# Patient Record
Sex: FEMALE | Race: BLACK OR AFRICAN AMERICAN | NOT HISPANIC OR LATINO | Employment: FULL TIME | URBAN - METROPOLITAN AREA
[De-identification: names, ages, dates, MRNs, and addresses within clinical notes are randomized per-mention and may not be internally consistent; named-entity substitution may affect disease eponyms.]

---
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
## 2019-01-25 ENCOUNTER — OFFICE VISIT (OUTPATIENT)
Dept: MEDICAL GROUP | Facility: MEDICAL CENTER | Age: 19
End: 2019-01-25
Attending: FAMILY MEDICINE
Payer: MEDICAID

## 2019-01-25 ENCOUNTER — HOSPITAL ENCOUNTER (OUTPATIENT)
Dept: LAB | Facility: MEDICAL CENTER | Age: 19
End: 2019-01-25
Attending: FAMILY MEDICINE
Payer: MEDICAID

## 2019-01-25 VITALS
HEART RATE: 88 BPM | TEMPERATURE: 98.1 F | DIASTOLIC BLOOD PRESSURE: 66 MMHG | HEIGHT: 62 IN | OXYGEN SATURATION: 99 % | SYSTOLIC BLOOD PRESSURE: 110 MMHG | RESPIRATION RATE: 18 BRPM | WEIGHT: 125 LBS | BODY MASS INDEX: 23 KG/M2

## 2019-01-25 DIAGNOSIS — M79.674 PAIN OF TOE OF RIGHT FOOT: ICD-10-CM

## 2019-01-25 DIAGNOSIS — Z3A.18 18 WEEKS GESTATION OF PREGNANCY: ICD-10-CM

## 2019-01-25 LAB
ERYTHROCYTE [SEDIMENTATION RATE] IN BLOOD BY WESTERGREN METHOD: 38 MM/HOUR (ref 0–20)
URATE SERPL-MCNC: 2.6 MG/DL (ref 1.9–8.2)

## 2019-01-25 PROCEDURE — 85652 RBC SED RATE AUTOMATED: CPT

## 2019-01-25 PROCEDURE — 99213 OFFICE O/P EST LOW 20 MIN: CPT | Performed by: FAMILY MEDICINE

## 2019-01-25 PROCEDURE — 36415 COLL VENOUS BLD VENIPUNCTURE: CPT

## 2019-01-25 PROCEDURE — 84550 ASSAY OF BLOOD/URIC ACID: CPT

## 2019-01-25 ASSESSMENT — PATIENT HEALTH QUESTIONNAIRE - PHQ9: CLINICAL INTERPRETATION OF PHQ2 SCORE: 0

## 2019-01-25 NOTE — PROGRESS NOTES
Chief Complaint:   Chief Complaint   Patient presents with   • Annual Exam       HPI: Established patient  Joann Medley is a 18 y.o. female who presents for      Pain of toe of right foot  Patient reports that around 2 month ago she started to have pain and swelling and enlargement of the second toe and she went to emergency room where she was treated as possible paronychia and incision and drainage and antibiotics was provided to her.  She said antibiotics did not help, she continued to have swelling of the toe without pain or tenderness, today she would like me to have a look at her toe and see if there is still infection.  Reviewed x-ray that was done at emergency room no evidence of acute deformity or fracture.       18 weeks gestation of pregnancy  This is an incidental pregnancy patient was on on her oral contraceptive pills and she missed the.  And she found out that she is pregnant did not establish care with pregnancy center yet, feels the baby movement, continues to take  vitamins.          Past medical history, family history, social history and medications reviewed and updated in the record. today  Current medications, problem list and allergies reviewed in [facility] today  Health maintenance topics are reviewed and updated.    Patient Active Problem List    Diagnosis Date Noted   • Anemia 2017   • Vitamin D deficiency disease 2017   • Tremor 10/27/2016   • Back pain 10/27/2016     Family History   Problem Relation Age of Onset   • Alcohol/Drug Father    • Psychiatry Father    • Cancer Paternal Grandmother         ?     Social History     Social History   • Marital status: Single     Spouse name: N/A   • Number of children: N/A   • Years of education: N/A     Occupational History   • Not on file.     Social History Main Topics   • Smoking status: Never Smoker   • Smokeless tobacco: Never Used   • Alcohol use No   • Drug use: No   • Sexual activity: Yes     Partners: Male      " Comment: pregnant      Other Topics Concern   • Behavioral Problems No   • Interpersonal Relationships No   • Sad Or Not Enjoying Activities No   • Suicidal Thoughts No   • Poor School Performance No   • Reading Difficulties No   • Speech Difficulties No   • Writing Difficulties No   • Inadequate Sleep No   • Excessive Tv Viewing No   • Excessive Video Game Use No   • Inadequate Exercise No   • Sports Related No   • Poor Diet Yes   • Family Concerns For Drug/Alcohol Abuse No   • Poor Oral Hygiene No   • Bike Safety Yes   • Family Concerns Vehicle Safety No     Social History Narrative   • No narrative on file     Current Outpatient Prescriptions   Medication Sig Dispense Refill   • Norgestim-Eth Estrad Triphasic 0.18/0.215/0.25 MG-25 MCG Tab Take 1 Tab by mouth every day. 28 Tab 11     No current facility-administered medications for this visit.            Review Of Systems  As documented in HPI above  PHYSICAL EXAMINATION:    /66 (BP Location: Left arm, Patient Position: Sitting, BP Cuff Size: Adult)   Pulse 88   Temp 36.7 °C (98.1 °F) (Temporal)   Resp 18   Ht 1.575 m (5' 2\")   Wt 56.7 kg (125 lb)   LMP 09/30/2018 (Within Days)   SpO2 99%   Breastfeeding? No   BMI 22.86 kg/m²   Gen.: Well-developed, well-nourished, no apparent distress, pleasant and cooperative with the examination  HEENT: Normocephalic/atraumatic,     Neck: No JVD or bruits, no adenopathy  Cor: Regular rate and rhythm without murmur gallop or rub  Lungs: Clear to auscultation with equal breath sounds bilaterally. No wheezes, rhonchi.  Abdomen: Soft abdomen, uterus at the level of 20    .    Extremities: No cyanosis, clubbing or edema, right foot exam second toe swollen compared to the other toes on the toe on the left side, there is no evidence of tenderness or erythema no pain no deformity nail bed without tenderness or drainage.        ASSESSMENT/Plan:  1. Pain of toe of right foot   etiology unclear, no evidence of infection or " inflammation active at this time advised to check uric acid level      URIC ACID    JUAQUIN SED RATE   2. 18 weeks gestation of pregnancy   encouraged to establish with pregnancy center for further pregnancy follow-up continue prenatal vitamins no red flags at this time do an ultrasound to assess fetal age and pregnancy assessment.      US-OB 2ND 3RD TRI COMPLETE       Please note that this dictation was created using voice recognition software. I have made every reasonable attempt to correct obvious errors but there may be errors of grammar and content that I may have overlooked prior to finalization of this note.

## 2019-02-07 ENCOUNTER — HOSPITAL ENCOUNTER (OUTPATIENT)
Dept: RADIOLOGY | Facility: MEDICAL CENTER | Age: 19
End: 2019-02-07
Attending: FAMILY MEDICINE
Payer: MEDICAID

## 2019-02-07 DIAGNOSIS — Z3A.18 18 WEEKS GESTATION OF PREGNANCY: ICD-10-CM

## 2019-02-07 PROCEDURE — 76805 OB US >/= 14 WKS SNGL FETUS: CPT

## 2019-04-18 ENCOUNTER — HOSPITAL ENCOUNTER (OUTPATIENT)
Facility: MEDICAL CENTER | Age: 19
End: 2019-04-18
Attending: NURSE PRACTITIONER
Payer: MEDICAID

## 2019-04-18 ENCOUNTER — APPOINTMENT (OUTPATIENT)
Dept: LAB | Facility: MEDICAL CENTER | Age: 19
End: 2019-04-18
Payer: MEDICAID

## 2019-04-18 ENCOUNTER — INITIAL PRENATAL (OUTPATIENT)
Dept: OBGYN | Facility: CLINIC | Age: 19
End: 2019-04-18
Payer: MEDICAID

## 2019-04-18 VITALS — BODY MASS INDEX: 25.06 KG/M2 | WEIGHT: 137 LBS | DIASTOLIC BLOOD PRESSURE: 68 MMHG | SYSTOLIC BLOOD PRESSURE: 112 MMHG

## 2019-04-18 DIAGNOSIS — Z34.00 PREGNANCY, SUPERVISION OF FIRST, UNSPECIFIED TRIMESTER: ICD-10-CM

## 2019-04-18 DIAGNOSIS — Z34.00 ENCOUNTER FOR SUPERVISION PREGNANCY IN PRIMIGRAVIDA, ANTEPARTUM: ICD-10-CM

## 2019-04-18 PROBLEM — O09.30 LATE PRENATAL CARE AFFECTING PREGNANCY: Status: ACTIVE | Noted: 2019-04-18

## 2019-04-18 LAB
APPEARANCE UR: NORMAL
BILIRUB UR STRIP-MCNC: NORMAL MG/DL
COLOR UR AUTO: NORMAL
GLUCOSE UR STRIP.AUTO-MCNC: NORMAL MG/DL
KETONES UR STRIP.AUTO-MCNC: NORMAL MG/DL
LEUKOCYTE ESTERASE UR QL STRIP.AUTO: NORMAL
NITRITE UR QL STRIP.AUTO: NORMAL
PH UR STRIP.AUTO: 6.5 [PH] (ref 5–8)
PROT UR QL STRIP: NORMAL MG/DL
RBC UR QL AUTO: NORMAL
SP GR UR STRIP.AUTO: 1.02
UROBILINOGEN UR STRIP-MCNC: NORMAL MG/DL

## 2019-04-18 PROCEDURE — 0500F INITIAL PRENATAL CARE VISIT: CPT | Performed by: NURSE PRACTITIONER

## 2019-04-18 PROCEDURE — 87591 N.GONORRHOEAE DNA AMP PROB: CPT

## 2019-04-18 PROCEDURE — 87491 CHLMYD TRACH DNA AMP PROBE: CPT

## 2019-04-18 PROCEDURE — 81002 URINALYSIS NONAUTO W/O SCOPE: CPT | Performed by: NURSE PRACTITIONER

## 2019-04-18 PROCEDURE — 90471 IMMUNIZATION ADMIN: CPT | Performed by: NURSE PRACTITIONER

## 2019-04-18 PROCEDURE — 90715 TDAP VACCINE 7 YRS/> IM: CPT | Performed by: NURSE PRACTITIONER

## 2019-04-18 NOTE — PROGRESS NOTES
Subjective:   Joann Medley is a 18 y.o.  who presents for her new OB exam.  She is 30w4d with an ALISON of Estimated Date of Delivery: 19 by LMP she is sure of that comes each month. She is feeling well and has no concerns at this time. Denies VB, LOF, contractions or pain. No ER visits or previous care in this pregnancy. Denies dysuria, vaginal DC, fever. Reports fetal movement. Too late AFP.  Declines CF.       History reviewed. No pertinent past medical history.    Psych Hx: Patient denies any history of depression, anxiety, PTSD, bipolar or any other psychological issues.     History reviewed. No pertinent surgical history.     OB History    Para Term  AB Living   1             SAB TAB Ectopic Molar Multiple Live Births                    # Outcome Date GA Lbr Kyle/2nd Weight Sex Delivery Anes PTL Lv   1 Current                    Gynecological Hx: Denies any hx of STIs, including HSV. Denies any vulvovaginal disorders and no hx of abnormal cervical cytology. Last pap not done.     Sexual Hx: Zero sexual partners currently.     Contraceptive Hx: Has used pills in the past and has since discontinued use. She got pregnant on pills because she was taking antibiotics and was not told that they interacted     Family History   Problem Relation Age of Onset   • Cancer Paternal Grandmother         ?     Denies any genetic disorders in family history.     Social History     Social History   • Marital status: Single     Spouse name: N/A   • Number of children: N/A   • Years of education: N/A     Occupational History   • Not on file.     Social History Main Topics   • Smoking status: Never Smoker   • Smokeless tobacco: Never Used   • Alcohol use No   • Drug use: No   • Sexual activity: Yes     Partners: Male     Birth control/ protection: Pill      Comment: Unplanned pregnanacy. Pt states was pregnant while on the pill     Other Topics Concern   • Behavioral Problems No   • Interpersonal  Relationships No   • Sad Or Not Enjoying Activities No   • Suicidal Thoughts No   • Poor School Performance No   • Reading Difficulties No   • Speech Difficulties No   • Writing Difficulties No   • Inadequate Sleep No   • Excessive Tv Viewing No   • Excessive Video Game Use No   • Inadequate Exercise No   • Sports Related No   • Poor Diet Yes   • Family Concerns For Drug/Alcohol Abuse No   • Poor Oral Hygiene No   • Bike Safety Yes   • Family Concerns Vehicle Safety No     Social History Narrative   • No narrative on file       NAI is semi-involved and does not live with Joann Medley. She lives with her aunt, grandma and mom. Pregnancy is unplanned but desired.    She is currently working at [employer], denies any heavy lifting or exposure to potential teratogens like environmental or occupational toxins.   Denies alcohol use, drug use, or tobacco use in pregnancy.   Denies any current or hx of sexual, emotional or physical abuse or trauma.     Current Medications: PNV   Allergies: Denies allergies to medications, food, or environmental allergies.     Objective:      Vitals:    04/18/19 0752   BP: 112/68   Weight: 62.1 kg (137 lb)        See Prenatal Physical and Prenatal Vitals  UA WNL today      Assessment:      1.  IUP @ 30w4d per LMP      2.  S=D      3.  See problem list as follows       Patient Active Problem List    Diagnosis Date Noted   • Supervision of first pregnancy  04/18/2019   • Late prenatal care affecting pregnancy 04/18/2019         Plan:   - GC/CT; no pap due to age  - 1 hr ordered  - s/p tdap today   - Prenatal labs ordered - lab slip given  - Discussed PNV, nutrition, adequate water intake, and exercise/weight gain in pregnancy  - NOB informational packet with anticipatory guidance given  - Information on Centering Pregnancy given, pt too late  - S/sx of pregnancy warning signs and PTL precautions given  - Complete OB US already done in February   - Return to [facility] in 2  wks

## 2019-04-18 NOTE — LETTER
Cystic Fibrosis Carrier Testing  Joann Medley    The following information is about a blood test that can be done to determine if you and/or your partner carry the gene for cystic fibrosis.    WHAT IS CYSTIC FIBROSIS?  · Cystic fibrosis (CF) is an inherited disease that affects more than 25,000 American children and young adults.  · Symptoms of CF vary but include lung congestion, pneumonia, diarrhea and poor growth.  Most people with CF have severe medical problems and some die at a young age.  Others have so few symptoms they are unaware they have CF.  · CF does not affect intelligence.  · Although there is no cure for CF at this time, scientists are making progress in improving treatment and in searching for a cure.  In the past many people with CF  at a very young age.  Today, many are living into their 20’s and 30’s.    IS THERE A CHANCE MY BABY COULD HAVE CYSTIC FIBROSIS?  · You can have a child with CF even if there is no history in your family (see chart below).  · CF testing can help determine if you are a carrier and at risk to have a child with CF.  Note: if both parents are carriers, there is a 1 in 4 (25%) chance with each pregnancy that they will have a child with CF.  · Carriers have one normal CF gene and one altered CF gene.  · People with CF have two altered CF genes.  · Most people have two normal copies of the CF gene.    Approximate risk that a couple with no family history of cystic fibrosis will have a child with cystic fibrosis:    Ethnic background / Risk     couple:  1 in 2,500   couple:  1 in 15,000            couple:  1 in 8,000     American couple:  1 in 32,000     WHAT TESTING IS AVAILABLE?  · There is a blood test that can be done to find out if you or your partner is a carrier.  · It is important to understand that CF carrier testing does not detect all CF carriers.  · If the test shows that you are both CF carriers, you unborn  baby can be tested to find out if the baby has CF.    HOW MUCH DOES IT COST TO HAVE CYSTIC FIBROSIS CARRIER TESTING?  · Cost and insurance coverage for CF carrier testing vary depending upon the laboratory used and your insurance policy.  · The average cost for CF carrier testing is $300 per person.  · Your genetic counselor can provide you with more information about cystic fibrosis carrier testing.    _____  Yes, I am interested in discussing carrier testing with a genetic counselor.    _____  No, I am not interested in CF carrier testing or in receiving more information about CF carrier testing.      Client signature: ________________________________________  4/18/2019

## 2019-04-18 NOTE — LETTER
"Count Your Baby's Movements  Another step to a healthy delivery    Ingrid Medley             Dept: [phone]    How Many Weeks Pregnant? 30W4D    Date to Begin Countin2019              How to use this chart    One way for your physician to keep track of your baby's health is by knowing how often the baby moves (or \"kicks\") in your womb.  You can help your physician to do this by using this chart every day.    Every day, you should see how many hours it takes for your baby to move 10 times.  Start in the morning, as soon as you get up.    · First, write down the time your baby moves until you get to 10.  · Check off one box every time your baby moves until you get to 10.  · Write down the time you finished counting in the last column.  · Total how long it took to count up all 10 movements.  · Finally, fill in the box that shows how long this took.  After counting 10 movements, you no longer have to count any more that day.  The next morning, just start counting again as soon as you get up.    What should you call a \"movement\"?  It is hard to say, because it will feel different from one mother to another and from one pregnancy to the next.  The important thing is that you count the movements the same way throughout your pregnancy.  If you have more questions, you should ask your physician.    Count carefully every day!  SAMPLE:  Week 28    How many hours did it take to feel 10 movements?       Start  Time     1     2     3     4     5     6     7     8     9     10   Finish Time   Mon 8:20 ·  ·  ·  ·  ·  ·  ·  ·  ·  ·  11:40                  Sat               Sun                 IMPORTANT: You should contact your physician if it takes more than two hours for you to feel 10 movements.  Each morning, write down the time and start to count the movements of your baby.  Keep track by checking off one box every time you feel one movement.  When you " "have felt 10 \"kicks\", write down the time you finished counting in the last column.  Then fill in the   box (over the check charlene) for the number of hours it took.  Be sure to read the complete instructions on the previous page.            "

## 2019-04-18 NOTE — PROGRESS NOTES
Pt here today for NOB visit  LMP: 09/16/2018  Pt had US at [facility] on 02/07/2019 with ALISON of 06/19/2019  WT: 137 lb  BP: 112/68  Pt states no complaints or concerns today  SIDNEY sheet given and explained today   Desires Tdap vaccine  Good # [phone]

## 2019-04-18 NOTE — LETTER
"Count Your Baby's Movements  Another step to a healthy delivery    [facility]             Dept: [phone]    How Many Weeks Pregnant? 30 weeks 4 days     Date to Begin Counting: today               How to use this chart    One way for your physician to keep track of your baby's health is by knowing how often the baby moves (or \"kicks\") in your womb.  You can help your physician to do this by using this chart every day.    Every day, you should see how many hours it takes for your baby to move 10 times.  Start in the morning, as soon as you get up.    · First, write down the time your baby moves until you get to 10.  · Check off one box every time your baby moves until you get to 10.  · Write down the time you finished counting in the last column.  · Total how long it took to count up all 10 movements.  · Finally, fill in the box that shows how long this took.  After counting 10 movements, you no longer have to count any more that day.  The next morning, just start counting again as soon as you get up.    What should you call a \"movement\"?  It is hard to say, because it will feel different from one mother to another and from one pregnancy to the next.  The important thing is that you count the movements the same way throughout your pregnancy.  If you have more questions, you should ask your physician.    Count carefully every day!  SAMPLE:  Week 28    How many hours did it take to feel 10 movements?       Start  Time     1     2     3     4     5     6     7     8     9     10   Finish Time   Mon 8:20 ·  ·  ·  ·  ·  ·  ·  ·  ·  ·  11:40   Tue Wed Thu Fri               Sat               Sun                 IMPORTANT: You should contact your physician if it takes more than two hours for you to feel 10 movements.  Each morning, write down the time and start to count the movements of your baby.  Keep track by checking off one box every time you feel one movement.  " "When you have felt 10 \"kicks\", write down the time you finished counting in the last column.  Then fill in the   box (over the check charlene) for the number of hours it took.  Be sure to read the complete instructions on the previous page.            "

## 2019-04-19 ENCOUNTER — HOSPITAL ENCOUNTER (OUTPATIENT)
Dept: LAB | Facility: MEDICAL CENTER | Age: 19
End: 2019-04-19
Attending: NURSE PRACTITIONER
Payer: MEDICAID

## 2019-04-19 DIAGNOSIS — Z34.00 PREGNANCY, SUPERVISION OF FIRST, UNSPECIFIED TRIMESTER: ICD-10-CM

## 2019-04-19 LAB
ABO GROUP BLD: NORMAL
APPEARANCE UR: CLEAR
BACTERIA #/AREA URNS HPF: ABNORMAL /HPF
BASOPHILS # BLD AUTO: 0.7 % (ref 0–1.8)
BASOPHILS # BLD: 0.03 K/UL (ref 0–0.12)
BILIRUB UR QL STRIP.AUTO: NEGATIVE
BLD GP AB SCN SERPL QL: NORMAL
C TRACH DNA SPEC QL NAA+PROBE: NEGATIVE
COLOR UR: YELLOW
EOSINOPHIL # BLD AUTO: 0.03 K/UL (ref 0–0.51)
EOSINOPHIL NFR BLD: 0.7 % (ref 0–6.9)
EPI CELLS #/AREA URNS HPF: ABNORMAL /HPF
ERYTHROCYTE [DISTWIDTH] IN BLOOD BY AUTOMATED COUNT: 39.4 FL (ref 35.9–50)
GLUCOSE 1H P 50 G GLC PO SERPL-MCNC: 75 MG/DL (ref 70–139)
GLUCOSE UR STRIP.AUTO-MCNC: NEGATIVE MG/DL
HBV SURFACE AG SER QL: NEGATIVE
HCT VFR BLD AUTO: 30.9 % (ref 37–47)
HGB BLD-MCNC: 9.6 G/DL (ref 12–16)
HIV 1+2 AB+HIV1 P24 AG SERPL QL IA: NON REACTIVE
IMM GRANULOCYTES # BLD AUTO: 0.02 K/UL (ref 0–0.11)
IMM GRANULOCYTES NFR BLD AUTO: 0.5 % (ref 0–0.9)
KETONES UR STRIP.AUTO-MCNC: NEGATIVE MG/DL
LEUKOCYTE ESTERASE UR QL STRIP.AUTO: ABNORMAL
LYMPHOCYTES # BLD AUTO: 0.84 K/UL (ref 1–4.8)
LYMPHOCYTES NFR BLD: 20.3 % (ref 22–41)
MCH RBC QN AUTO: 27.7 PG (ref 27–33)
MCHC RBC AUTO-ENTMCNC: 31.1 G/DL (ref 33.6–35)
MCV RBC AUTO: 89.3 FL (ref 81.4–97.8)
MICRO URNS: ABNORMAL
MONOCYTES # BLD AUTO: 0.33 K/UL (ref 0–0.85)
MONOCYTES NFR BLD AUTO: 8 % (ref 0–13.4)
MUCOUS THREADS #/AREA URNS HPF: ABNORMAL /HPF
N GONORRHOEA DNA SPEC QL NAA+PROBE: NEGATIVE
NEUTROPHILS # BLD AUTO: 2.89 K/UL (ref 2–7.15)
NEUTROPHILS NFR BLD: 69.8 % (ref 44–72)
NITRITE UR QL STRIP.AUTO: NEGATIVE
NRBC # BLD AUTO: 0 K/UL
NRBC BLD-RTO: 0 /100 WBC
PH UR STRIP.AUTO: 6.5 [PH]
PLATELET # BLD AUTO: 145 K/UL (ref 164–446)
PMV BLD AUTO: 11.4 FL (ref 9–12.9)
PROT UR QL STRIP: NEGATIVE MG/DL
RBC # BLD AUTO: 3.46 M/UL (ref 4.2–5.4)
RBC # URNS HPF: ABNORMAL /HPF
RBC UR QL AUTO: NEGATIVE
RH BLD: NORMAL
RUBV AB SER QL: 122 IU/ML
SP GR UR STRIP.AUTO: 1.02
SPECIMEN SOURCE: NORMAL
TREPONEMA PALLIDUM IGG+IGM AB [PRESENCE] IN SERUM OR PLASMA BY IMMUNOASSAY: NON REACTIVE
UROBILINOGEN UR STRIP.AUTO-MCNC: 0.2 MG/DL
WBC # BLD AUTO: 4.1 K/UL (ref 4.8–10.8)
WBC #/AREA URNS HPF: ABNORMAL /HPF

## 2019-04-19 PROCEDURE — 36415 COLL VENOUS BLD VENIPUNCTURE: CPT

## 2019-04-19 PROCEDURE — 86762 RUBELLA ANTIBODY: CPT

## 2019-04-19 PROCEDURE — 87340 HEPATITIS B SURFACE AG IA: CPT

## 2019-04-19 PROCEDURE — 87389 HIV-1 AG W/HIV-1&-2 AB AG IA: CPT

## 2019-04-19 PROCEDURE — 85025 COMPLETE CBC W/AUTO DIFF WBC: CPT

## 2019-04-19 PROCEDURE — 82950 GLUCOSE TEST: CPT

## 2019-04-19 PROCEDURE — 86780 TREPONEMA PALLIDUM: CPT

## 2019-04-19 PROCEDURE — 81001 URINALYSIS AUTO W/SCOPE: CPT

## 2019-04-19 PROCEDURE — 86901 BLOOD TYPING SEROLOGIC RH(D): CPT

## 2019-04-19 PROCEDURE — 86900 BLOOD TYPING SEROLOGIC ABO: CPT

## 2019-04-19 PROCEDURE — 86850 RBC ANTIBODY SCREEN: CPT

## 2019-04-20 PROBLEM — O99.019 ANEMIA IN PREGNANCY: Status: ACTIVE | Noted: 2019-04-20

## 2019-04-22 ENCOUNTER — TELEPHONE (OUTPATIENT)
Dept: OBGYN | Facility: CLINIC | Age: 19
End: 2019-04-22

## 2019-04-22 NOTE — TELEPHONE ENCOUNTER
----- Message from LIBERTY Jane sent at 4/20/2019  8:11 PM PDT -----  Pt needs iron 3 times a day.    4/22/19 1040 Called pt, [facility] reach [facility]. [facility]

## 2019-04-22 NOTE — TELEPHONE ENCOUNTER
Pt called returned Jacqueline's call.  Pt notified of need to start on Iron supplementation to take 325 mg TID. Recommended to take it with orange juice, to avoid milk products 1hr before and 2hr after. Not to take with PNV. To increase water intake to decrease side effects of constipation. Pt verbalized understanding.

## 2019-04-24 ENCOUNTER — HOSPITAL ENCOUNTER (EMERGENCY)
Facility: MEDICAL CENTER | Age: 19
End: 2019-04-24
Attending: EMERGENCY MEDICINE
Payer: MEDICAID

## 2019-04-24 VITALS
RESPIRATION RATE: 17 BRPM | HEART RATE: 90 BPM | SYSTOLIC BLOOD PRESSURE: 105 MMHG | TEMPERATURE: 98.1 F | OXYGEN SATURATION: 99 % | DIASTOLIC BLOOD PRESSURE: 63 MMHG

## 2019-04-24 DIAGNOSIS — E86.0 DEHYDRATION: ICD-10-CM

## 2019-04-24 DIAGNOSIS — R55 SYNCOPE, UNSPECIFIED SYNCOPE TYPE: ICD-10-CM

## 2019-04-24 DIAGNOSIS — E87.6 HYPOKALEMIA: ICD-10-CM

## 2019-04-24 LAB
ALBUMIN SERPL BCP-MCNC: 2.2 G/DL (ref 3.2–4.9)
ALBUMIN/GLOB SERPL: 0.7 G/DL
ALP SERPL-CCNC: 55 U/L (ref 30–99)
ALT SERPL-CCNC: 9 U/L (ref 2–50)
ANION GAP SERPL CALC-SCNC: 7 MMOL/L (ref 0–11.9)
AST SERPL-CCNC: 20 U/L (ref 12–45)
BASOPHILS # BLD AUTO: 0.5 % (ref 0–1.8)
BASOPHILS # BLD: 0.02 K/UL (ref 0–0.12)
BILIRUB SERPL-MCNC: 0.3 MG/DL (ref 0.1–1.5)
BUN SERPL-MCNC: 7 MG/DL (ref 8–22)
CALCIUM SERPL-MCNC: 7.7 MG/DL (ref 8.4–10.2)
CHLORIDE SERPL-SCNC: 106 MMOL/L (ref 96–112)
CO2 SERPL-SCNC: 20 MMOL/L (ref 20–33)
CREAT SERPL-MCNC: 0.79 MG/DL (ref 0.5–1.4)
EKG IMPRESSION: NORMAL
EOSINOPHIL # BLD AUTO: 0.04 K/UL (ref 0–0.51)
EOSINOPHIL NFR BLD: 1 % (ref 0–6.9)
ERYTHROCYTE [DISTWIDTH] IN BLOOD BY AUTOMATED COUNT: 40 FL (ref 35.9–50)
GLOBULIN SER CALC-MCNC: 3.2 G/DL (ref 1.9–3.5)
GLUCOSE SERPL-MCNC: 100 MG/DL (ref 65–99)
HCT VFR BLD AUTO: 27.6 % (ref 37–47)
HGB BLD-MCNC: 8.7 G/DL (ref 12–16)
IMM GRANULOCYTES # BLD AUTO: 0.01 K/UL (ref 0–0.11)
IMM GRANULOCYTES NFR BLD AUTO: 0.2 % (ref 0–0.9)
LYMPHOCYTES # BLD AUTO: 1.11 K/UL (ref 1–4.8)
LYMPHOCYTES NFR BLD: 27.7 % (ref 22–41)
MCH RBC QN AUTO: 27.5 PG (ref 27–33)
MCHC RBC AUTO-ENTMCNC: 31.5 G/DL (ref 33.6–35)
MCV RBC AUTO: 87.3 FL (ref 81.4–97.8)
MONOCYTES # BLD AUTO: 0.4 K/UL (ref 0–0.85)
MONOCYTES NFR BLD AUTO: 10 % (ref 0–13.4)
NEUTROPHILS # BLD AUTO: 2.43 K/UL (ref 2–7.15)
NEUTROPHILS NFR BLD: 60.6 % (ref 44–72)
NRBC # BLD AUTO: 0 K/UL
NRBC BLD-RTO: 0 /100 WBC
PLATELET # BLD AUTO: 149 K/UL (ref 164–446)
PMV BLD AUTO: 11.4 FL (ref 9–12.9)
POTASSIUM SERPL-SCNC: 3 MMOL/L (ref 3.6–5.5)
PROT SERPL-MCNC: 5.4 G/DL (ref 6–8.2)
RBC # BLD AUTO: 3.16 M/UL (ref 4.2–5.4)
SODIUM SERPL-SCNC: 133 MMOL/L (ref 135–145)
WBC # BLD AUTO: 4 K/UL (ref 4.8–10.8)

## 2019-04-24 PROCEDURE — 94760 N-INVAS EAR/PLS OXIMETRY 1: CPT

## 2019-04-24 PROCEDURE — 80053 COMPREHEN METABOLIC PANEL: CPT

## 2019-04-24 PROCEDURE — 93005 ELECTROCARDIOGRAM TRACING: CPT

## 2019-04-24 PROCEDURE — 700102 HCHG RX REV CODE 250 W/ 637 OVERRIDE(OP): Performed by: EMERGENCY MEDICINE

## 2019-04-24 PROCEDURE — A9270 NON-COVERED ITEM OR SERVICE: HCPCS | Performed by: EMERGENCY MEDICINE

## 2019-04-24 PROCEDURE — 99285 EMERGENCY DEPT VISIT HI MDM: CPT

## 2019-04-24 PROCEDURE — 85025 COMPLETE CBC W/AUTO DIFF WBC: CPT

## 2019-04-24 PROCEDURE — 700105 HCHG RX REV CODE 258: Performed by: EMERGENCY MEDICINE

## 2019-04-24 RX ORDER — POTASSIUM CHLORIDE 20 MEQ/1
20 TABLET, EXTENDED RELEASE ORAL ONCE
Status: COMPLETED | OUTPATIENT
Start: 2019-04-24 | End: 2019-04-24

## 2019-04-24 RX ORDER — SODIUM CHLORIDE 9 MG/ML
1000 INJECTION, SOLUTION INTRAVENOUS ONCE
Status: COMPLETED | OUTPATIENT
Start: 2019-04-24 | End: 2019-04-24

## 2019-04-24 RX ADMIN — SODIUM CHLORIDE 1000 ML: 9 INJECTION, SOLUTION INTRAVENOUS at 18:12

## 2019-04-24 RX ADMIN — POTASSIUM CHLORIDE 20 MEQ: 1500 TABLET, EXTENDED RELEASE ORAL at 18:49

## 2019-04-25 NOTE — ED PROVIDER NOTES
ED Provider Note    ED Provider Note    Primary care provider: Ellie Muhammad M.D.    CHIEF COMPLAINT  Chief Complaint   Patient presents with   • Syncope     pt bib REMSA for sycopal episodes X2 today. Patient 31 weeks pregnant.        HPI  Joann Medley is a 18 y.o. female who presents with a complaint of passing out.  She was sitting in the passenger seat of a car when she was unresponsive.  Her family member states she was talking to her at the time.  She seemed fine when she got in the car.  She states that she looked over and she was unresponsive very pale.  She came to after about 10 seconds.  Paramedics were called.  In route she had another syncopal episode.  No cardiac arrhythmia in route.  She did have a low blood pressure.  She did recently have blood work completed which showed severe anemia.  They are not willing to have a blood transfusion so she was placed on iron and new prenatal vitamins.  She is 31 weeks pregnant.  She denies any bleeding such as black or bloody stools or nosebleeds.  She denies any other complaints.  She has had no complications with her pregnancy.  She has normal prenatal care has had a normal ultrasound.    REVIEW OF SYSTEMS  ROS  All other systems negative. See HPI for further details.       ALLERGIES  No Known Allergies    CURRENT MEDICATIONS  Home Medications    **Home medications have not yet been reviewed for this encounter**     Prenatal vitamins and iron    PAST MEDICAL HISTORY   has a past medical history of Anemia in pregnancy (4/20/2019).    SURGICAL HISTORY  patient denies any surgical history    SOCIAL HISTORY  Social History   Substance Use Topics   • Smoking status: Never Smoker   • Smokeless tobacco: Never Used   • Alcohol use No      History   Drug Use No       FAMILY HISTORY  Family History   Problem Relation Age of Onset   • Cancer Paternal Grandmother         ?         PHYSICAL EXAM  VITAL SIGNS: /63   Pulse 90   Temp 36.7 °C (98.1 °F)  (Temporal)   Resp 17   LMP 09/16/2018   SpO2 99%   Constitutional: Well-nourished, Well developed. In mild distress.   Head: Normocephalic, Atraumatic  Eyes: PERRL, sclera anicteric, EOMI, no lid swelling.  ENT: No nasal discharge or epistaxis. No facial deformity. Mucous membranes are moist.  Pale gingiva.  Cardiovascular: Regular rate and rhythm without S3,S4, or murmur.   Lungs: No respiratory distress. No cough. Lungs are clear bilaterally. No rales, rhonchi, or wheezing.   Abdomen: Soft, non-tender, gravid uterus. Without organomegaly. No rebound, rigidity, or guarding. No masses or abnormal pulsations.   Skin: Without significant rash or lesions.  Skin pallor is noted.  Extremities: No deformity. Non-tender. No swelling.  Neurologic: Awake and alert. Normal speech. No sensory deficits. No motor deficits.       DIAGNOSTIC STUDIES / PROCEDURES    Results for orders placed or performed during the hospital encounter of 04/24/19   CBC WITH DIFFERENTIAL   Result Value Ref Range    WBC 4.0 (L) 4.8 - 10.8 K/uL    RBC 3.16 (L) 4.20 - 5.40 M/uL    Hemoglobin 8.7 (L) 12.0 - 16.0 g/dL    Hematocrit 27.6 (L) 37.0 - 47.0 %    MCV 87.3 81.4 - 97.8 fL    MCH 27.5 27.0 - 33.0 pg    MCHC 31.5 (L) 33.6 - 35.0 g/dL    RDW 40.0 35.9 - 50.0 fL    Platelet Count 149 (L) 164 - 446 K/uL    MPV 11.4 9.0 - 12.9 fL    Neutrophils-Polys 60.60 44.00 - 72.00 %    Lymphocytes 27.70 22.00 - 41.00 %    Monocytes 10.00 0.00 - 13.40 %    Eosinophils 1.00 0.00 - 6.90 %    Basophils 0.50 0.00 - 1.80 %    Immature Granulocytes 0.20 0.00 - 0.90 %    Nucleated RBC 0.00 /100 WBC    Neutrophils (Absolute) 2.43 2.00 - 7.15 K/uL    Lymphs (Absolute) 1.11 1.00 - 4.80 K/uL    Monos (Absolute) 0.40 0.00 - 0.85 K/uL    Eos (Absolute) 0.04 0.00 - 0.51 K/uL    Baso (Absolute) 0.02 0.00 - 0.12 K/uL    Immature Granulocytes (abs) 0.01 0.00 - 0.11 K/uL    NRBC (Absolute) 0.00 K/uL   COMP METABOLIC PANEL   Result Value Ref Range    Sodium 133 (L) 135 - 145  mmol/L    Potassium 3.0 (L) 3.6 - 5.5 mmol/L    Chloride 106 96 - 112 mmol/L    Co2 20 20 - 33 mmol/L    Anion Gap 7.0 0.0 - 11.9    Glucose 100 (H) 65 - 99 mg/dL    Bun 7 (L) 8 - 22 mg/dL    Creatinine 0.79 0.50 - 1.40 mg/dL    Calcium 7.7 (L) 8.4 - 10.2 mg/dL    AST(SGOT) 20 12 - 45 U/L    ALT(SGPT) 9 2 - 50 U/L    Alkaline Phosphatase 55 30 - 99 U/L    Total Bilirubin 0.3 0.1 - 1.5 mg/dL    Albumin 2.2 (L) 3.2 - 4.9 g/dL    Total Protein 5.4 (L) 6.0 - 8.2 g/dL    Globulin 3.2 1.9 - 3.5 g/dL    A-G Ratio 0.7 g/dL   ESTIMATED GFR   Result Value Ref Range    GFR If African American >60 >60 mL/min/1.73 m 2    GFR If Non African American >60 >60 mL/min/1.73 m 2   EKG   Result Value Ref Range    Report       [facility] Emergency Dept.    Test Date:  2019  Pt Name:    DESTINY VELÁSQUEZ            Department: [facility]  MRN:        [id number]                      Room:       -ROOM 4  Gender:     Female                       Technician: MARIELOS  :        2000                   Requested By:ER TRIAGE PROTOCOL  Order #:    [id number]                    Reading MD:    Measurements  Intervals                                Axis  Rate:       87                           P:          30  MN:         114                          QRS:        35  QRSD:       80                           T:          29  QT:         361  QTc:        435    Interpretive Statements  Sinus rhythm  Borderline short MN interval  Probable left atrial enlargement  No previous ECG available for comparison     EKG interp by myself.  Agree with NSR, 87 bpm, no ectopy, normal axis, no ST or T wave changes.    RADIOLOGY  No orders to display     Films read by Radiologist, and independently reviewed by myself.    COURSE & MEDICAL DECISION MAKING:  Nursing notes, VS, PMSFHx reviewed in chart.     Patient was evaluated in the emergency department.  She had no arrhythmia during her ER visit.  She improved with IV fluids alone.  She is not  as anemic as I was anticipating however her hemoglobin has dropped about 1 g in 5 days.  I cannot contribute this to her syncope however she may be slightly dehydrated which has improved with IV fluids.  She has a follow-up appointment with her OB next week.  She does need repeat blood work to check her hemoglobin and her potassium.  She was treated for hypo-kalemia with 20 mEq of K-Dur.  She was encouraged to stay hydrated and can use electrolyte water.  She is to look for signs of bleeding including black or bloody stools.  She has normal fetal heart tones and there is no indication for fetal monitoring on labor and delivery at this time.  She does need to maintain bedrest and keep her legs elevated and she was given a work note for tonight.  If she does not feel better tomorrow she needs to follow-up with her OB doctor for further evaluation.  She should refrain from driving, swimming or being up on ladders until she is cleared by her doctor.  She did not have a seizure therefore the DMV form is not required. She understood and agreed to the above plan.      FINAL IMPRESSION:  1. Dehydration    2. Syncope, unspecified syncope type    3. Hypokalemia         DISPOSITION:  Home    Please note that this dictation was created using voice recognition software. I have worked with consultants from the vendor as well as technical experts from [facility] to optimize the interface. I have made every reasonable attempt to correct obvious errors, but I expect that there are errors of grammar and possibly content that I did not discover before finalizing the note.

## 2019-04-25 NOTE — ED TRIAGE NOTES
Chief Complaint   Patient presents with   • Syncope     pt bib REMSA for sycopal episodes X2 today. Patient 31 weeks pregnant.

## 2019-05-02 ENCOUNTER — ROUTINE PRENATAL (OUTPATIENT)
Dept: OBGYN | Facility: CLINIC | Age: 19
End: 2019-05-02
Payer: MEDICAID

## 2019-05-02 VITALS — BODY MASS INDEX: 25.97 KG/M2 | SYSTOLIC BLOOD PRESSURE: 100 MMHG | WEIGHT: 142 LBS | DIASTOLIC BLOOD PRESSURE: 64 MMHG

## 2019-05-02 DIAGNOSIS — Z34.03 ENCOUNTER FOR SUPERVISION OF NORMAL FIRST PREGNANCY IN THIRD TRIMESTER: ICD-10-CM

## 2019-05-02 PROCEDURE — 90040 PR PRENATAL FOLLOW UP: CPT | Performed by: NURSE PRACTITIONER

## 2019-05-02 RX ORDER — FERROUS SULFATE 325(65) MG
325 TABLET ORAL DAILY
Qty: 30 TAB | Refills: 6 | Status: SHIPPED | OUTPATIENT
Start: 2019-05-02 | End: 2019-11-14

## 2019-05-02 NOTE — PROGRESS NOTES
Pt here today for OB follow up  Pt states   Reports +FM  Good # [phone]  Pharmacy Confirmed.

## 2019-05-02 NOTE — PROGRESS NOTES
S:  Pt is  at 32w4d for routine OB follow up.  Was seen in ED on  following syncope x 2 episodes. She says this happened after she started taking the iron supplements she was told to take. She has not taken the iron supplements since then.  Reports good FM.  Denies VB, LOF, RUCs or vaginal DC.    O:  Please see above vitals.        FHTs: 142        Fundal ht: 32 cm.        S=D    A:  IUP at 32w4d  Patient Active Problem List    Diagnosis Date Noted   • Anemia in pregnancy 2019   • Supervision of first pregnancy  2019   • Late prenatal care affecting pregnancy 2019        P: Discussed with pt the probability of her fainting was brought on by low BP due to her severe anemia rather than by the iron supplements. Given her reluctance to taking iron 3 times a day, we discussed taking her pnv daily, an iron supplement daily and adding iron rich foods to her diet: dark green leafy vegetables, iron fortified cereals, etc. Pt receptive to plan.          1.  GBS @ 35 wks.          2.  Continue FKCs.          3.  Questions answered.          4.  Encouraged pt to tour L&D.          5.  Encourage adequate water intake.        6.  F/u 2 wks.        7.  Rx for iron supplement

## 2019-05-15 ENCOUNTER — ROUTINE PRENATAL (OUTPATIENT)
Dept: OBGYN | Facility: CLINIC | Age: 19
End: 2019-05-15
Payer: MEDICAID

## 2019-05-15 VITALS — BODY MASS INDEX: 26.89 KG/M2 | DIASTOLIC BLOOD PRESSURE: 62 MMHG | WEIGHT: 147 LBS | SYSTOLIC BLOOD PRESSURE: 110 MMHG

## 2019-05-15 DIAGNOSIS — Z34.03 ENCOUNTER FOR PRENATAL CARE IN THIRD TRIMESTER OF FIRST PREGNANCY: Primary | ICD-10-CM

## 2019-05-15 PROCEDURE — 90040 PR PRENATAL FOLLOW UP: CPT | Performed by: NURSE PRACTITIONER

## 2019-05-15 NOTE — PROGRESS NOTES
S) Pt is a 18 y.o.   at 34w3d  gestation. Routine prenatal care today. Pt having some restless leg syndrome at night.    Fetal movement Normal  Cramping no  VB no  LOF no   Denies dysuria. Generally feels well today. Good self-care activities identified. Denies headaches, swelling, visual changes, or epigastric pain .     O) /62   Wt 66.7 kg (147 lb)         Labs:       PNL:  anemia       GCT:75       AFP: Not Examined       GBS: N/A       Pertinent ultrasound - 19 ABRAHAM 15.6, survey WNL, c/w prev dating           A) IUP at 34w3d       S=D         Patient Active Problem List    Diagnosis Date Noted   • Anemia in pregnancy 2019   • Supervision of first pregnancy  2019   • Late prenatal care affecting pregnancy 2019          SVE: deferred         TDAP: yes       FLU: no        BTL: no       : no       C/S Consent: no       IOL or C/S scheduled: no       LAST PAP: def d/t age         P) s/s ptl vs general discomforts. Fetal movements reviewed. General ed and anticipatory guidance. Nutrition/exercise/vitamin. Plans breast Plans pp contraception- unsure    Given info on tour.  Discussed walking/bath/tylenol prior to bed for restless leg.  RTC 1-2 weeks for GBS swab.

## 2019-05-15 NOTE — Clinical Note
This pt was told by 20 week us it was a girl then I look at US and it says not seen!  Shes freaking out a little because shes prepped for girl.  Told her to try to see you next visit on the DL and if you had time, you might look.  Neyda

## 2019-05-21 ENCOUNTER — APPOINTMENT (OUTPATIENT)
Dept: OTHER | Facility: IMAGING CENTER | Age: 19
End: 2019-05-21

## 2019-05-30 ENCOUNTER — HOSPITAL ENCOUNTER (OUTPATIENT)
Facility: MEDICAL CENTER | Age: 19
End: 2019-05-30
Attending: ADVANCED PRACTICE MIDWIFE
Payer: MEDICAID

## 2019-05-30 ENCOUNTER — ROUTINE PRENATAL (OUTPATIENT)
Dept: OBGYN | Facility: CLINIC | Age: 19
End: 2019-05-30
Payer: MEDICAID

## 2019-05-30 VITALS — DIASTOLIC BLOOD PRESSURE: 64 MMHG | SYSTOLIC BLOOD PRESSURE: 112 MMHG | WEIGHT: 152 LBS | BODY MASS INDEX: 27.8 KG/M2

## 2019-05-30 DIAGNOSIS — Z34.03 SUPERVISION OF NORMAL FIRST PREGNANCY IN THIRD TRIMESTER: ICD-10-CM

## 2019-05-30 PROCEDURE — 87081 CULTURE SCREEN ONLY: CPT

## 2019-05-30 PROCEDURE — 90040 PR PRENATAL FOLLOW UP: CPT | Performed by: ADVANCED PRACTICE MIDWIFE

## 2019-05-30 PROCEDURE — 87150 DNA/RNA AMPLIFIED PROBE: CPT

## 2019-05-30 NOTE — PROGRESS NOTES
Pt here today for OB follow up  Pt states no complaints   Reports +FM  Good # [phone]  Pharmacy Confirmed.  Chaperone offered and declined.   GBS today

## 2019-05-30 NOTE — PROGRESS NOTES
SUBJECTIVE:  Pt is a 18 y.o.   at 36w4d  gestation. Presents today for follow-up prenatal care. Has not been seen in ER or L & D since last visit. Reports good  fetal movement. Denies leaking of fluid dysuria, headaches, or N/V at this time.  Patient does not report cramping/contractions. Generally feels well today. Continuing with restless leg.    OBJECTIVE:   /64   Wt 68.9 kg (152 lb)   LMP 2018   BMI 27.80 kg/m²   Patients' weight gain, fluid intake and exercise level discussed.  Vitals, fundal height , fetal position, and FHR reviewed on flowsheet    Lab:No results found for this or any previous visit (from the past 336 hour(s)).    ASSESSMENT/ PLAN:   - IUP at 36w4d    - S=D   -   Patient Active Problem List    Diagnosis Date Noted   • Anemia in pregnancy 2019   • Supervision of first pregnancy  2019   • Late prenatal care affecting pregnancy 2019       Tdap: 2019    - S/sx pregnancy and labor warning signs vs general discomforts discussed  - Fetal movements and kick counts reviewed. Adequate hydration reinforced  - Anticipatory guidance provided. Encouraged continued use of ferrous sulfate.   - RTC in 1 weeks for routine prenatal care.

## 2019-06-01 LAB — GP B STREP DNA SPEC QL NAA+PROBE: NEGATIVE

## 2019-06-06 ENCOUNTER — ROUTINE PRENATAL (OUTPATIENT)
Dept: OBGYN | Facility: CLINIC | Age: 19
End: 2019-06-06
Payer: MEDICAID

## 2019-06-06 VITALS — BODY MASS INDEX: 27.44 KG/M2 | DIASTOLIC BLOOD PRESSURE: 66 MMHG | WEIGHT: 150 LBS | SYSTOLIC BLOOD PRESSURE: 100 MMHG

## 2019-06-06 DIAGNOSIS — Z34.03 ENCOUNTER FOR PRENATAL CARE IN THIRD TRIMESTER OF FIRST PREGNANCY: Primary | ICD-10-CM

## 2019-06-06 PROCEDURE — 90040 PR PRENATAL FOLLOW UP: CPT | Performed by: NURSE PRACTITIONER

## 2019-06-06 NOTE — PROGRESS NOTES
S) Pt is a 18 y.o.   at 37w4d  gestation. Routine prenatal care today. No complaints today. GBS negative results reviewed, all questions answered. Discussed SVE and IOL referral at next visit. Labor precautions reviewed, all questions answered.    Fetal movement Normal  Cramping no  VB no  LOF no   Denies dysuria. Generally feels well today. Good self-care activities identified. Denies headaches, swelling, visual changes, or epigastric pain .     O) /66   Wt 68 kg (150 lb)         Labs:       PNL: WNL       GCT: 75        AFP: Not Examined       GBS: negative       Pertinent ultrasound -        19- Survey WNL, ABRAHAM 15.6cm, c/w prev dating.    A) IUP at 37w4d       S=D         Patient Active Problem List    Diagnosis Date Noted   • Anemia in pregnancy 2019   • Supervision of first pregnancy  2019   • Late prenatal care affecting pregnancy 2019          SVE: deferred       Chaperone offered: n/a         TDAP: yes       FLU: no        BTL: no       : n/a       C/S Consent: n/a       IOL or C/S scheduled: no       LAST PAP: deferred due to age         P) s/s ptl vs general discomforts. Fetal movements reviewed. General ed and anticipatory guidance. Nutrition/exercise/vitamin. Plans breast Plans pp contraception- unsure  Continue PNV.

## 2019-06-06 NOTE — PROGRESS NOTES
OB f/u. + fetal movement.  No VB, LOF or UC's.  Wt:150lbs        BP:100/66  Good phone # [phone]  Preferred pharmacy confirmed.  GBS negative

## 2019-06-13 ENCOUNTER — ROUTINE PRENATAL (OUTPATIENT)
Dept: OBGYN | Facility: CLINIC | Age: 19
End: 2019-06-13
Payer: MEDICAID

## 2019-06-13 VITALS — BODY MASS INDEX: 27.62 KG/M2 | WEIGHT: 151 LBS | SYSTOLIC BLOOD PRESSURE: 112 MMHG | DIASTOLIC BLOOD PRESSURE: 66 MMHG

## 2019-06-13 DIAGNOSIS — Z34.00 SUPERVISION OF NORMAL FIRST PREGNANCY, ANTEPARTUM: ICD-10-CM

## 2019-06-13 PROCEDURE — 90040 PR PRENATAL FOLLOW UP: CPT | Performed by: OBSTETRICS & GYNECOLOGY

## 2019-06-13 NOTE — PROGRESS NOTES
Pt here today for OB follow up  Pt states no complaints   Reports +FM  Good # [phone]  Pharmacy Confirmed.  Chaperone offered and not indicated.   GBS negative.

## 2019-06-13 NOTE — PROGRESS NOTES
S: Pt presents for routine OB follow up.  No contractions, vaginal bleeding, or leaking fluids. Good fetal movement.    Patient Active Problem List   Diagnosis   • Supervision of first pregnancy    • Late prenatal care affecting pregnancy   • Anemia in pregnancy       O: /66   Wt 68.5 kg (151 lb)   LMP 2018   BMI 27.62 kg/m²   Patients' weight gain, fluid intake and exercise level discussed.  Vitals, fundal height , fetal position, and FHR reviewed on flowsheet    SVE: fingertip / 50 / -3  FH: 155  Fundus: 37    Lab:No results found for this or any previous visit (from the past 336 hour(s)).    Prenatal labs:  T&S: A+/ ab neg  Prenatal panel neg  Pap smear: not assessed  GBS: GBS neg  1 hour: 75    Level II: normal anatomy.     A/P:  18 y.o.  at 38w4d presents for routine obstetric follow-up.     - Delivery plan: IOL requested today  - Continue prenatal vitamins.  - Labor precautions and fetal kick counts   - Follow-up in 1 weeks.

## 2019-06-20 ENCOUNTER — ROUTINE PRENATAL (OUTPATIENT)
Dept: OBGYN | Facility: CLINIC | Age: 19
End: 2019-06-20
Payer: MEDICAID

## 2019-06-20 VITALS — DIASTOLIC BLOOD PRESSURE: 70 MMHG | WEIGHT: 153 LBS | BODY MASS INDEX: 27.98 KG/M2 | SYSTOLIC BLOOD PRESSURE: 120 MMHG

## 2019-06-20 DIAGNOSIS — O09.30 LATE PRENATAL CARE AFFECTING PREGNANCY, ANTEPARTUM: ICD-10-CM

## 2019-06-20 PROCEDURE — 90040 PR PRENATAL FOLLOW UP: CPT | Performed by: NURSE PRACTITIONER

## 2019-06-20 NOTE — PROGRESS NOTES
SUBJECTIVE:  Pt is a 18 y.o.   at 39w4d  gestation. Presents today for follow-up prenatal care. Reports no issues at this time.  Reports good fetal movement. Denies cramping/contractions, bleeding or leaking of fluid. Denies dysuria, headaches, N/V, or other issues at this time. Generally feels well today. Pt taking iron once a day.     OBJECTIVE:  - See prenatal vitals flow  -   Vitals:    19 1033   BP: 120/70   Weight: 69.4 kg (153 lb)                 ASSESSMENT:   - IUP at 39w4d    - S=D   -   Patient Active Problem List    Diagnosis Date Noted   • Anemia in pregnancy 2019   • Supervision of first pregnancy  2019   • Late prenatal care affecting pregnancy 2019         PLAN:  - S/sx pregnancy and labor warning signs vs general discomforts discussed  - Fetal movements and/or kick counts reviewed   - Adequate hydration reinforced  - Nutrition/exercise/vitamin education; continue PNV  - Plans for breastfeeding:handout given and reviewed   - Plans abstinence for contraception Pp: handout given and reviewed  - S/p TDAP vacc   - S/p Flu vacc   - To increase to iron to 2-3 times a day   - Pt declines check today   - Nightly walks   - Anticipatory guidance given  - RTC in 1 weeks for follow-up prenatal care  - Siri contacted to expedite IOL that has been placed

## 2019-06-20 NOTE — PROGRESS NOTES
Pt here today for OB follow up  Negative GBS, pt aware  Reports +FM  WT: 153 lb  BP: 120/70  Pt states no complaints or concerns today  Good # [phone]

## 2019-06-23 ENCOUNTER — HOSPITAL ENCOUNTER (OUTPATIENT)
Facility: MEDICAL CENTER | Age: 19
End: 2019-06-23
Attending: OBSTETRICS & GYNECOLOGY | Admitting: OBSTETRICS & GYNECOLOGY
Payer: MEDICAID

## 2019-06-23 VITALS
TEMPERATURE: 98.6 F | HEIGHT: 62 IN | SYSTOLIC BLOOD PRESSURE: 120 MMHG | BODY MASS INDEX: 28.16 KG/M2 | DIASTOLIC BLOOD PRESSURE: 81 MMHG | HEART RATE: 98 BPM | WEIGHT: 153 LBS

## 2019-06-23 PROCEDURE — 59025 FETAL NON-STRESS TEST: CPT

## 2019-06-23 PROCEDURE — 59025 FETAL NON-STRESS TEST: CPT | Mod: 26 | Performed by: NURSE PRACTITIONER

## 2019-06-24 ENCOUNTER — ANESTHESIA (OUTPATIENT)
Dept: OBGYN | Facility: MEDICAL CENTER | Age: 19
End: 2019-06-24
Payer: MEDICAID

## 2019-06-24 ENCOUNTER — ANESTHESIA EVENT (OUTPATIENT)
Dept: OBGYN | Facility: MEDICAL CENTER | Age: 19
End: 2019-06-24
Payer: MEDICAID

## 2019-06-24 ENCOUNTER — HOSPITAL ENCOUNTER (INPATIENT)
Facility: MEDICAL CENTER | Age: 19
LOS: 2 days | End: 2019-06-26
Attending: OBSTETRICS & GYNECOLOGY | Admitting: FAMILY MEDICINE
Payer: MEDICAID

## 2019-06-24 LAB
BASOPHILS # BLD AUTO: 0.7 % (ref 0–1.8)
BASOPHILS # BLD: 0.03 K/UL (ref 0–0.12)
EOSINOPHIL # BLD AUTO: 0.02 K/UL (ref 0–0.51)
EOSINOPHIL NFR BLD: 0.5 % (ref 0–6.9)
ERYTHROCYTE [DISTWIDTH] IN BLOOD BY AUTOMATED COUNT: 51.4 FL (ref 35.9–50)
HCT VFR BLD AUTO: 40.7 % (ref 37–47)
HGB BLD-MCNC: 13.6 G/DL (ref 12–16)
HOLDING TUBE BB 8507: NORMAL
IMM GRANULOCYTES # BLD AUTO: 0.02 K/UL (ref 0–0.11)
IMM GRANULOCYTES NFR BLD AUTO: 0.5 % (ref 0–0.9)
LYMPHOCYTES # BLD AUTO: 1.44 K/UL (ref 1–4.8)
LYMPHOCYTES NFR BLD: 32.7 % (ref 22–41)
MCH RBC QN AUTO: 29.1 PG (ref 27–33)
MCHC RBC AUTO-ENTMCNC: 33.4 G/DL (ref 33.6–35)
MCV RBC AUTO: 87.2 FL (ref 81.4–97.8)
MONOCYTES # BLD AUTO: 0.41 K/UL (ref 0–0.85)
MONOCYTES NFR BLD AUTO: 9.3 % (ref 0–13.4)
NEUTROPHILS # BLD AUTO: 2.49 K/UL (ref 2–7.15)
NEUTROPHILS NFR BLD: 56.3 % (ref 44–72)
NRBC # BLD AUTO: 0 K/UL
NRBC BLD-RTO: 0 /100 WBC
PLATELET # BLD AUTO: 145 K/UL (ref 164–446)
PMV BLD AUTO: 12.2 FL (ref 9–12.9)
RBC # BLD AUTO: 4.67 M/UL (ref 4.2–5.4)
WBC # BLD AUTO: 4.4 K/UL (ref 4.8–10.8)

## 2019-06-24 PROCEDURE — 85025 COMPLETE CBC W/AUTO DIFF WBC: CPT

## 2019-06-24 PROCEDURE — 770002 HCHG ROOM/CARE - OB PRIVATE (112)

## 2019-06-24 PROCEDURE — 700111 HCHG RX REV CODE 636 W/ 250 OVERRIDE (IP): Performed by: FAMILY MEDICINE

## 2019-06-24 PROCEDURE — 700111 HCHG RX REV CODE 636 W/ 250 OVERRIDE (IP)

## 2019-06-24 PROCEDURE — 304965 HCHG RECOVERY SERVICES

## 2019-06-24 PROCEDURE — 700105 HCHG RX REV CODE 258

## 2019-06-24 PROCEDURE — 700105 HCHG RX REV CODE 258: Performed by: FAMILY MEDICINE

## 2019-06-24 PROCEDURE — 36415 COLL VENOUS BLD VENIPUNCTURE: CPT

## 2019-06-24 PROCEDURE — 59400 OBSTETRICAL CARE: CPT | Performed by: FAMILY MEDICINE

## 2019-06-24 PROCEDURE — 0KQM0ZZ REPAIR PERINEUM MUSCLE, OPEN APPROACH: ICD-10-PCS | Performed by: FAMILY MEDICINE

## 2019-06-24 PROCEDURE — 303615 HCHG EPIDURAL/SPINAL ANESTHESIA FOR LABOR

## 2019-06-24 PROCEDURE — 700111 HCHG RX REV CODE 636 W/ 250 OVERRIDE (IP): Performed by: ANESTHESIOLOGY

## 2019-06-24 PROCEDURE — 59409 OBSTETRICAL CARE: CPT

## 2019-06-24 RX ORDER — MISOPROSTOL 200 UG/1
600 TABLET ORAL
Status: DISCONTINUED | OUTPATIENT
Start: 2019-06-24 | End: 2019-06-26 | Stop reason: HOSPADM

## 2019-06-24 RX ORDER — HYDROCODONE BITARTRATE AND ACETAMINOPHEN 5; 325 MG/1; MG/1
1 TABLET ORAL EVERY 4 HOURS PRN
Status: DISCONTINUED | OUTPATIENT
Start: 2019-06-24 | End: 2019-06-26 | Stop reason: HOSPADM

## 2019-06-24 RX ORDER — SODIUM CHLORIDE, SODIUM LACTATE, POTASSIUM CHLORIDE, AND CALCIUM CHLORIDE .6; .31; .03; .02 G/100ML; G/100ML; G/100ML; G/100ML
1000 INJECTION, SOLUTION INTRAVENOUS
Status: DISCONTINUED | OUTPATIENT
Start: 2019-06-24 | End: 2019-06-24 | Stop reason: HOSPADM

## 2019-06-24 RX ORDER — SODIUM CHLORIDE, SODIUM LACTATE, POTASSIUM CHLORIDE, CALCIUM CHLORIDE 600; 310; 30; 20 MG/100ML; MG/100ML; MG/100ML; MG/100ML
INJECTION, SOLUTION INTRAVENOUS CONTINUOUS
Status: DISPENSED | OUTPATIENT
Start: 2019-06-24 | End: 2019-06-24

## 2019-06-24 RX ORDER — BUPIVACAINE HYDROCHLORIDE 2.5 MG/ML
INJECTION, SOLUTION EPIDURAL; INFILTRATION; INTRACAUDAL PRN
Status: DISCONTINUED | OUTPATIENT
Start: 2019-06-24 | End: 2019-06-24 | Stop reason: SURG

## 2019-06-24 RX ORDER — CARBOPROST TROMETHAMINE 250 UG/ML
250 INJECTION, SOLUTION INTRAMUSCULAR
Status: DISCONTINUED | OUTPATIENT
Start: 2019-06-24 | End: 2019-06-24 | Stop reason: HOSPADM

## 2019-06-24 RX ORDER — CARBOPROST TROMETHAMINE 250 UG/ML
250 INJECTION, SOLUTION INTRAMUSCULAR
Status: DISCONTINUED | OUTPATIENT
Start: 2019-06-24 | End: 2019-06-26 | Stop reason: HOSPADM

## 2019-06-24 RX ORDER — ROPIVACAINE HYDROCHLORIDE 2 MG/ML
INJECTION, SOLUTION EPIDURAL; INFILTRATION; PERINEURAL
Status: COMPLETED
Start: 2019-06-24 | End: 2019-06-24

## 2019-06-24 RX ORDER — MISOPROSTOL 200 UG/1
800 TABLET ORAL
Status: DISCONTINUED | OUTPATIENT
Start: 2019-06-24 | End: 2019-06-24 | Stop reason: HOSPADM

## 2019-06-24 RX ORDER — VITAMIN A ACETATE, BETA CAROTENE, ASCORBIC ACID, CHOLECALCIFEROL, .ALPHA.-TOCOPHEROL ACETATE, DL-, THIAMINE MONONITRATE, RIBOFLAVIN, NIACINAMIDE, PYRIDOXINE HYDROCHLORIDE, FOLIC ACID, CYANOCOBALAMIN, CALCIUM CARBONATE, FERROUS FUMARATE, ZINC OXIDE, CUPRIC OXIDE 3080; 12; 120; 400; 1; 1.84; 3; 20; 22; 920; 25; 200; 27; 10; 2 [IU]/1; UG/1; MG/1; [IU]/1; MG/1; MG/1; MG/1; MG/1; MG/1; [IU]/1; MG/1; MG/1; MG/1; MG/1; MG/1
1 TABLET, FILM COATED ORAL EVERY MORNING
Status: DISCONTINUED | OUTPATIENT
Start: 2019-06-25 | End: 2019-06-26 | Stop reason: HOSPADM

## 2019-06-24 RX ORDER — HYDROCODONE BITARTRATE AND ACETAMINOPHEN 10; 325 MG/1; MG/1
1 TABLET ORAL EVERY 4 HOURS PRN
Status: DISCONTINUED | OUTPATIENT
Start: 2019-06-24 | End: 2019-06-26 | Stop reason: HOSPADM

## 2019-06-24 RX ORDER — METHYLERGONOVINE MALEATE 0.2 MG/ML
0.2 INJECTION INTRAVENOUS
Status: DISCONTINUED | OUTPATIENT
Start: 2019-06-24 | End: 2019-06-24 | Stop reason: HOSPADM

## 2019-06-24 RX ORDER — SODIUM CHLORIDE, SODIUM LACTATE, POTASSIUM CHLORIDE, CALCIUM CHLORIDE 600; 310; 30; 20 MG/100ML; MG/100ML; MG/100ML; MG/100ML
INJECTION, SOLUTION INTRAVENOUS
Status: COMPLETED
Start: 2019-06-24 | End: 2019-06-24

## 2019-06-24 RX ORDER — BISACODYL 10 MG
10 SUPPOSITORY, RECTAL RECTAL PRN
Status: DISCONTINUED | OUTPATIENT
Start: 2019-06-24 | End: 2019-06-26 | Stop reason: HOSPADM

## 2019-06-24 RX ORDER — METHYLERGONOVINE MALEATE 0.2 MG/ML
0.2 INJECTION INTRAVENOUS
Status: DISCONTINUED | OUTPATIENT
Start: 2019-06-24 | End: 2019-06-26 | Stop reason: HOSPADM

## 2019-06-24 RX ORDER — IBUPROFEN 600 MG/1
600 TABLET ORAL EVERY 6 HOURS PRN
Status: DISCONTINUED | OUTPATIENT
Start: 2019-06-24 | End: 2019-06-26 | Stop reason: HOSPADM

## 2019-06-24 RX ORDER — SODIUM CHLORIDE, SODIUM LACTATE, POTASSIUM CHLORIDE, AND CALCIUM CHLORIDE .6; .31; .03; .02 G/100ML; G/100ML; G/100ML; G/100ML
250 INJECTION, SOLUTION INTRAVENOUS PRN
Status: DISCONTINUED | OUTPATIENT
Start: 2019-06-24 | End: 2019-06-24 | Stop reason: HOSPADM

## 2019-06-24 RX ORDER — ROPIVACAINE HYDROCHLORIDE 2 MG/ML
INJECTION, SOLUTION EPIDURAL; INFILTRATION; PERINEURAL CONTINUOUS
Status: DISCONTINUED | OUTPATIENT
Start: 2019-06-24 | End: 2019-06-26 | Stop reason: HOSPADM

## 2019-06-24 RX ORDER — DOCUSATE SODIUM 100 MG/1
100 CAPSULE, LIQUID FILLED ORAL 2 TIMES DAILY PRN
Status: DISCONTINUED | OUTPATIENT
Start: 2019-06-24 | End: 2019-06-26 | Stop reason: HOSPADM

## 2019-06-24 RX ORDER — SODIUM CHLORIDE, SODIUM LACTATE, POTASSIUM CHLORIDE, CALCIUM CHLORIDE 600; 310; 30; 20 MG/100ML; MG/100ML; MG/100ML; MG/100ML
INJECTION, SOLUTION INTRAVENOUS PRN
Status: DISCONTINUED | OUTPATIENT
Start: 2019-06-24 | End: 2019-06-26 | Stop reason: HOSPADM

## 2019-06-24 RX ADMIN — SODIUM CHLORIDE, POTASSIUM CHLORIDE, SODIUM LACTATE AND CALCIUM CHLORIDE: 600; 310; 30; 20 INJECTION, SOLUTION INTRAVENOUS at 16:50

## 2019-06-24 RX ADMIN — FENTANYL CITRATE 100 MCG: 50 INJECTION INTRAMUSCULAR; INTRAVENOUS at 15:24

## 2019-06-24 RX ADMIN — Medication 2000 ML/HR: at 18:07

## 2019-06-24 RX ADMIN — OXYTOCIN 125 ML/HR: 10 INJECTION, SOLUTION INTRAMUSCULAR; INTRAVENOUS at 20:30

## 2019-06-24 RX ADMIN — SODIUM CHLORIDE, POTASSIUM CHLORIDE, SODIUM LACTATE AND CALCIUM CHLORIDE 1000 ML: 600; 310; 30; 20 INJECTION, SOLUTION INTRAVENOUS at 15:24

## 2019-06-24 RX ADMIN — BUPIVACAINE HYDROCHLORIDE 10 ML: 2.5 INJECTION, SOLUTION EPIDURAL; INFILTRATION; INTRACAUDAL; PERINEURAL at 16:08

## 2019-06-24 RX ADMIN — ROPIVACAINE HYDROCHLORIDE: 2 INJECTION, SOLUTION EPIDURAL; INFILTRATION; PERINEURAL at 16:24

## 2019-06-24 RX ADMIN — ROPIVACAINE HYDROCHLORIDE: 2 INJECTION, SOLUTION EPIDURAL; INFILTRATION at 16:24

## 2019-06-24 ASSESSMENT — LIFESTYLE VARIABLES
ALCOHOL_USE: NO
EVER_SMOKED: NEVER

## 2019-06-24 ASSESSMENT — PATIENT HEALTH QUESTIONNAIRE - PHQ9
2. FEELING DOWN, DEPRESSED, IRRITABLE, OR HOPELESS: NOT AT ALL
SUM OF ALL RESPONSES TO PHQ9 QUESTIONS 1 AND 2: 0
1. LITTLE INTEREST OR PLEASURE IN DOING THINGS: NOT AT ALL

## 2019-06-24 NOTE — H&P
LABOR AND DELIVERY HISTORY AND PHYSICAL    PATIENT ID:  NAME:  Joann Medley  MRN:               [id number]  YOB: 2000    CC:  Spontaneous ROM    HPI:  Joann Medley is a 18 y.o. female  at 40w1d by a 21 week ultrasound performed on 19/LMP on Patient's last menstrual period was 2018.. Estimated Date of Delivery: 19  Patient presents complaining of uterine contractions, with loss of fluid.  Fluid was brown, SROM ~1445.  normal fetal movement.  no vaginal bleeding.  Pregnancy was complicated by late prenatal care, teen pregnancy, and anemia.    ROS: Patient denies any fever chills, nausea, vomiting, headache, chest pain, shortness of breath, or dysuria or unusual swelling of hands or feet.     Prenatal Care: Obtained at [facility], 1st visit 19 with 7 total visits.  Third trimester BPs were approximately 110s/60s.  Total weight gain 7 kg during the pregnancy.    Prenatal Labs:   HepBsAg: Neg HIV: Neg Rubella: Imm   RPR: Neg PAP: N/A GBS: Neg   GC/CT: Neg A+/ Ab Neg Quad Screen: N/A   No results for input(s): WBC, RBC, HEMOGLOBIN, HEMATOCRIT, MCV, MCH, RDW, PLATELETCT, MPV, NEUTSPOLYS, LYMPHOCYTES, MONOCYTES, EOSINOPHILS, BASOPHILS, RBCMORPHOLO in the last 72 hours.  No results for input(s): SODIUM, POTASSIUM, CHLORIDE, CO2, GLUCOSE, BUN, CPKTOTAL in the last 72 hours.       IMAGING:   OB ultrasound:   2019 9:21 AM    HISTORY/REASON FOR EXAM:  Evaluate fetal anatomy    TECHNIQUE/EXAM DESCRIPTION: OB complete ultrasound.    COMPARISON:  None    FINDINGS:  Fetal Lie:  Vertex  LMP:  2018  Clinical ALISON by LMP:  2019    Placenta (Location):  Anterior  Placenta Previa: No  Placental Grade: I    Amniotic Fluid Volume:  ABRAHAM = 15.6 cm    Fetal Heart Rate:  153 bpm    Cervical Length:  3.38 cm    No maternal adnexal mass is identified.    Umbilical Artery S/D Ratio(s):  Not applicable    Fetal Anatomy  (Seen or Not Seen)  Lateral Ventricles     Seen  Cisterna Magna         Seen  Cerebellum              Seen  CSP             Seen  Orbits             Seen  Face/Lips                Seen  Cord Insertion         Seen  Placental CI         Seen  4 Chamber Heart     Seen  LVOT               Seen  RVOT              Seen  3 Vessel View     Seen  Stomach       Seen  Kidneys                   Seen  Urinary Bladder      Seen  Spine                       Seen  3 Vessel Cord          Seen  Both Upper Extremities    Seen  Both Lower Extremities    Seen  Diaphragm             Seen  Movement       Seen  Gender:  NOT seen    Fetal Biometry  BPD    5.04 cm, 21w 2d  HC    18.45 cm, 20w 6d  AC    15.56 cm, 20w 5d  Femur Length    3.63 cm, 21w 4d  Humerus Length    3.37 cm, 21w 3d  Cerebellum Diameter   2.22 cm    EGA by this US:  21w 1d  ALISON by this US: 2019  ALSION by 1st US:  Not applicable    Estimated Fetal Weight:  397 g    Comments:   Impression       Single intrauterine pregnancy of an estimated gestational age of 21w 1d with an estimated date of delivery of 2019.    Fetal survey within normal limits.         POB Hx:  OB History    Para Term  AB Living   1             SAB TAB Ectopic Molar Multiple Live Births                    # Outcome Date GA Lbr Kyle/2nd Weight Sex Delivery Anes PTL Lv   1 Current                   PMH/Problem List:    Past Medical History:   Diagnosis Date   • Anemia in pregnancy 2019     Patient Active Problem List    Diagnosis Date Noted   • Anemia in pregnancy 2019   • Supervision of first pregnancy  2019   • Late prenatal care affecting pregnancy 2019       Current Outpatient Medications:  No current facility-administered medications on file prior to encounter.      Current Outpatient Prescriptions on File Prior to Encounter   Medication Sig Dispense Refill   • ferrous sulfate 325 (65 Fe) MG tablet Take 1 Tab by mouth every day. 30 Tab 6   • Prenatal MV-Min-Fe Fum-FA-DHA (PRENATAL 1 PO) Take  by mouth.     • Norgestim-Eth Estrad  "Triphasic 0.18/0.215/0.25 MG-25 MCG Tab Take 1 Tab by mouth every day. (Patient not taking: Reported on 2019) 28 Tab 11       PSH:    No past surgical history on file.    Allergies:   No Known Allergies    SH:  Social History     Social History   • Marital status: Single     Spouse name: N/A   • Number of children: N/A   • Years of education: N/A     Occupational History   • Not on file.     Social History Main Topics   • Smoking status: Never Smoker   • Smokeless tobacco: Never Used   • Alcohol use No   • Drug use: No   • Sexual activity: Yes     Partners: Male     Birth control/ protection: Pill      Comment: Unplanned pregnanacy. Pt states was pregnant while on the pill     Other Topics Concern   • Behavioral Problems No   • Interpersonal Relationships No   • Sad Or Not Enjoying Activities No   • Suicidal Thoughts No   • Poor School Performance No   • Reading Difficulties No   • Speech Difficulties No   • Writing Difficulties No   • Inadequate Sleep No   • Excessive Tv Viewing No   • Excessive Video Game Use No   • Inadequate Exercise No   • Sports Related No   • Poor Diet Yes   • Family Concerns For Drug/Alcohol Abuse No   • Poor Oral Hygiene No   • Bike Safety Yes   • Family Concerns Vehicle Safety No     Social History Narrative   • No narrative on file         PHYSICAL EXAM:  Vitals:    19 1459   BP: 130/80   Pulse: 77   Resp: 20   Temp: 36.9 °C (98.4 °F)   TempSrc: Temporal   Weight: 69.4 kg (153 lb)   Height: 1.575 m (5' 2.01\")     Temp (24hrs), Av.9 °C (98.5 °F), Min:36.9 °C (98.4 °F), Max:37 °C (98.6 °F)    General: No acute distress, resting comfortably in bed.  HEENT: normocephalic, nontraumatic, PERRLA, EOMI  Cardiovascular: Heart RRR with no murmurs, rubs or gallops. Distal Pulses 2+  Respiratory: symmetric chest expansion, lungs CTA bilaterally with no wheezes rales or rhonci  Abdomen: gravid, nontender  Musculoskeletal: strength 5/5 in four extremities  Neuro: non focal with no " numbness, tingling or changes in sensation    SVE: 4-5/90%/-1  [place]: Q2-3 minutes; EFM: 135 with no accels, moderate variability, variable decels    A/P: Intrauterine pregancy at 40w1d weeks in active labor here for SROM and contractions.      Patient Active Problem List    Diagnosis Date Noted   • Anemia in pregnancy 2019   • Supervision of first pregnancy  2019   • Late prenatal care affecting pregnancy 2019       1. IUP at term  2. Patient is GBS Neg  3. Cat 2 tracing  4. Anticipating     Vinny Valdivia M.D.

## 2019-06-24 NOTE — PROGRESS NOTES
1455-pt presents from home with c/o uc's since 1130 and SROM at 1430 with moderate meconium, no c/o bleeding or other pain, states baby is moving normally, placed on external monitors, vs taken, pt grossly ruptured with moderate meconium, SVE 4-5/90/-1  1510-report given to Dr Valdivia, admission order received  1515-iv started, labs drawn and sent  1524-fentanyl given, relief obtained, pt requesting an epidural  1610-epidural placed per Dr Devine, tolerated   1645-SVE complete/+2, will have pt rest before pushing  1740-started pushing  1804-episiotomy cut for FHR,  viable female per Dr Valdivia  180-placenta  1900-report given to night shift RN, POC discussed

## 2019-06-24 NOTE — PROGRESS NOTES
NST reactive per my read:    Indication for: CTXs  FHR baseline: 140  Variability: moderate  Accelerations: +  Decelerations: variable decels x 2, resolved with position change  VAS: none  TOCO: irreg CTX noted

## 2019-06-24 NOTE — DISCHARGE INSTRUCTIONS
Perform a kick count once a day at approximately the same time each day. Babies' activity levels are usually higher in the evening after dinner.    To perform a kick count, lie on your left side and focus on your baby's movements: rolls, kicks, or flutters. Record the number of minutes it takes to feel your baby move ten times. You may stop counting after your baby has moved 10 times.    If your baby does not move at least 1 times in 30 minutes or if there is a sudden decrease in movement, call your doctor or nurse midwife.Labor Instructions (37 - 39 weeks):  Call your physician or return to hospital if:  · You have regular contractions that get progressively closer, longer and stronger.  · Your water breaks (remember time and color).  · You have bleeding like a period.  · Your baby does not move enough to complete the daily kick counts (10 movements in 2 hours)  · Your baby moves much less often than on the days before or you have not felt your baby move all day.

## 2019-06-24 NOTE — PROGRESS NOTES
Pt presented to L&D for c/o decreased fetal movement. Pt denies regular, painful UC's, LOF, vaginal bleeding. EFM and TOCO applied. Pt monitored x 45 minutes. DANII Alejandro- updated on pt c/o, FM tracing- moderate variability with accelerations, 2 variables with reactive tracing observed after. Strip reviewed by DANII Alejandro CNM. Orders to monitor x 30 minutes then discharge home if reactive. Pt and family updated on POC. Pt is a G 1 P0 with an EDC of 6/23/19 making her 40 0/7.

## 2019-06-24 NOTE — PROGRESS NOTES
DANII Alejandro CNM phoned. Strip reviewed .Orders to discharge pt home received. Labor pre cautions and fetal kick count instructions given. Pt and family verbalized an understanding. Pt discharged home with family.

## 2019-06-25 LAB
ERYTHROCYTE [DISTWIDTH] IN BLOOD BY AUTOMATED COUNT: 52.7 FL (ref 35.9–50)
HCT VFR BLD AUTO: 32.2 % (ref 37–47)
HGB BLD-MCNC: 10.1 G/DL (ref 12–16)
MCH RBC QN AUTO: 27.5 PG (ref 27–33)
MCHC RBC AUTO-ENTMCNC: 31.4 G/DL (ref 33.6–35)
MCV RBC AUTO: 87.7 FL (ref 81.4–97.8)
MORPHOLOGY BLD-IMP: NORMAL
PLATELET # BLD AUTO: 128 K/UL (ref 164–446)
PMV BLD AUTO: 12.1 FL (ref 9–12.9)
RBC # BLD AUTO: 3.67 M/UL (ref 4.2–5.4)
WBC # BLD AUTO: 8.6 K/UL (ref 4.8–10.8)

## 2019-06-25 PROCEDURE — A9270 NON-COVERED ITEM OR SERVICE: HCPCS | Performed by: FAMILY MEDICINE

## 2019-06-25 PROCEDURE — 85027 COMPLETE CBC AUTOMATED: CPT

## 2019-06-25 PROCEDURE — 36415 COLL VENOUS BLD VENIPUNCTURE: CPT

## 2019-06-25 PROCEDURE — 770002 HCHG ROOM/CARE - OB PRIVATE (112)

## 2019-06-25 PROCEDURE — 700112 HCHG RX REV CODE 229: Performed by: FAMILY MEDICINE

## 2019-06-25 PROCEDURE — 700102 HCHG RX REV CODE 250 W/ 637 OVERRIDE(OP): Performed by: FAMILY MEDICINE

## 2019-06-25 RX ADMIN — Medication 1 TABLET: at 11:17

## 2019-06-25 RX ADMIN — IBUPROFEN 600 MG: 600 TABLET ORAL at 01:14

## 2019-06-25 RX ADMIN — IBUPROFEN 600 MG: 600 TABLET ORAL at 17:47

## 2019-06-25 RX ADMIN — DOCUSATE SODIUM 100 MG: 100 CAPSULE, LIQUID FILLED ORAL at 11:17

## 2019-06-25 RX ADMIN — IBUPROFEN 600 MG: 600 TABLET ORAL at 11:17

## 2019-06-25 ASSESSMENT — EDINBURGH POSTNATAL DEPRESSION SCALE (EPDS)
I HAVE BEEN SO UNHAPPY THAT I HAVE BEEN CRYING: NO, NEVER
THE THOUGHT OF HARMING MYSELF HAS OCCURRED TO ME: NEVER
I HAVE BEEN ANXIOUS OR WORRIED FOR NO GOOD REASON: HARDLY EVER
I HAVE FELT SAD OR MISERABLE: NOT VERY OFTEN
I HAVE LOOKED FORWARD WITH ENJOYMENT TO THINGS: AS MUCH AS I EVER DID
I HAVE BEEN ABLE TO LAUGH AND SEE THE FUNNY SIDE OF THINGS: AS MUCH AS I ALWAYS COULD
I HAVE BEEN SO UNHAPPY THAT I HAVE HAD DIFFICULTY SLEEPING: NOT AT ALL
I HAVE BLAMED MYSELF UNNECESSARILY WHEN THINGS WENT WRONG: YES, SOME OF THE TIME
I HAVE FELT SCARED OR PANICKY FOR NO GOOD REASON: NO, NOT AT ALL
THINGS HAVE BEEN GETTING ON TOP OF ME: NO, MOST OF THE TIME I HAVE COPED QUITE WELL

## 2019-06-25 NOTE — CARE PLAN
Problem: Altered physiologic condition related to immediate post-delivery state and potential for bleeding/hemorrhage  Goal: Patient physiologically stable as evidenced by normal lochia, palpable uterine involution and vital signs within normal limits  Outcome: PROGRESSING AS EXPECTED  Pt is firm and light with Pitocin running    Problem: Potential for postpartum infection related to presence of episiotomy/vaginal tear and/or uterine contamination  Goal: Patient will be absent from signs and symptoms of infection  Outcome: PROGRESSING AS EXPECTED  Pt not exhibiting s/s of infection

## 2019-06-25 NOTE — PROGRESS NOTES
Obstetrics & Gynecology Post-Delivery Progress Note    Date of Service      18 y.o.  s/p vaginal, spontaneous  Delivery date: 19    Events  No events    Subjective  Pain: No  Bleeding: lochia minimal  PO's: taking regular diet  Voiding: without difficulty  Ambulating: yes  Passing flatus: Yes  Feeding: breastfeeding well    Objective  Temp:  [36.6 °C (97.8 °F)-36.9 °C (98.4 °F)] 36.6 °C (97.8 °F)  Pulse:  [64-97] 77  Resp:  [17-20] 17  BP: (118-151)/(62-97) 118/73  SpO2:  [96 %-99 %] 97 %    Physical Exam  General: well  Chest/Breasts: nipples intact and breasts soft   Abdomen: nontender, normal bowel sounds, soft, non-distended  Fundus: firm and below umbilicus  Incision: not applicable, (vaginal delivery)  Perineum: well approximated and well healing  Extremities: symmetric, calves nontender    Recent Labs      19   1515  19   0459   WBC  4.4*  8.6   RBC  4.67  3.67*   HEMOGLOBIN  13.6  10.1*   HEMATOCRIT  40.7  32.2*   MCV  87.2  87.7   MCH  29.1  27.5   RDW  51.4*  52.7*   PLATELETCT  145*  128*   MPV  12.2  12.1   NEUTSPOLYS  56.30   --    LYMPHOCYTES  32.70   --    MONOCYTES  9.30   --    EOSINOPHILS  0.50   --    BASOPHILS  0.70   --        Assessment/Plan  Joann Medley is a 18 y.o.yo  s/p postpartum day #1  s/p vaginal, spontaneous    1. Post care: meeting all goals  2. Hemodynamics: stable  3. Pain: controlled  4. Rh+, Rubella Immune  5. Method of Feeding: plans to breastfeed  6. Method of Contraception: abstinence for now, thinking about IUD   7. Disposition: likely home postpartum day 2    VTE prophylaxis: none indicated, ambulating

## 2019-06-25 NOTE — ANESTHESIA TIME REPORT
Anesthesia Start and Stop Event Times     Date Time Event    6/24/2019 1608 Anesthesia Start     1804 Anesthesia Stop        Responsible Staff  06/24/19    Name Role Begin End    Luisito Devine M.D. Anesth 1608 1804        Preop Diagnosis (Free Text):  Pre-op Diagnosis             Preop Diagnosis (Codes):    Post op Diagnosis  Vaginal delivery      Premium Reason  A. 3PM - 7AM    Comments:

## 2019-06-25 NOTE — ANESTHESIA QCDR
2019 [facility] Clinical Data Registry (for Quality Improvement)     Postoperative nausea/vomiting risk protocol (Adult = 18 yrs and Pediatric 3-17 yrs)- (430 and 463)  General inhalation anesthetic (NOT TIVA) with PONV risk factors: No  Provision of anti-emetic therapy with at least 2 different classes of agents: N/A  Patient DID NOT receive anti-emetic therapy and reason is documented in Medical Record: N/A    Multimodal Pain Management- (AQI59)  Patient undergoing Elective Surgery (i.e. Outpatient, or ASC, or Prescheduled Surgery prior to Hospital Admission): No  Use of Multimodal Pain Management, two or more drugs and/or interventions, NOT including systemic opioids: N/A  Exception: Documented allergy to multiple classes of analgesics: N/A    PACU assessment of acute postoperative pain prior to Anesthesia Care End- Applies to Patients Age = 18- (ABG7)  Initial PACU pain score is which of the following: < 7/10  Patient unable to report pain score: N/A    Post-anesthetic transfer of care checklist/protocol to PACU/ICU- (426 and 427)  Upon conclusion of case, patient transferred to which of the following locations: PACU/Non-ICU  Use of transfer checklist/protocol: Yes  Exclusion: Service Performed in Patient Hospital Room (and thus did not require transfer): N/A    PACU Reintubation- (AQI31)  General anesthesia requiring endotracheal intubation (ETT) along with subsequent extubation in OR or PACU: No  Required reintubation in the PACU: N/A  Extubation was a planned trial documented in the medical record prior to removal of the original airway device: N/A    Unplanned admission to ICU related to anesthesia service up through end of PACU care- (MD51)  Unplanned admission to ICU (not initially anticipated at anesthesia start time): No

## 2019-06-25 NOTE — PROGRESS NOTES
1900-Report from Vira Ramos.  1830-Pt up to restroo, steady.  1845-Pt transferred to postpartum in stable conditon with firm fundas and light lochia. Report to VIRA Wilkins

## 2019-06-25 NOTE — CARE PLAN
Problem: Knowledge Deficit  Goal: Patient/Support person demonstrates understanding regarding the progression of labor, available options and participates in decision-making process    Intervention: Instruct patient/support person in basic interpretation of fetal monitor  Pt and family members educated in basic interpretation of fetal monitors, verbalized understanding      Problem: Pain  Goal: Alleviation of Pain or a reduction in pain to the patient's comfort goal    Intervention: Pain Management - Epidural/Spinal  Received an epidural for pain management, adequate relief

## 2019-06-25 NOTE — PROGRESS NOTES
Patient care assumed at 0700. Assessment complete. Patient is stable, vitals WNL. Fundus firm, light lochia. Patient is breastfeeding. Patient educated at time of assessment about keeping bed in lowest position. Call light within reach, upper rails up, table at bedside. Will continue to monitor.

## 2019-06-25 NOTE — LACTATION NOTE
This note was copied from a baby's chart.  MOB reports that breastfeeding continues to hurts. Assessment of nipples shows cracks with bleeding. Infant sleep right now, Encouraged to call for assist with a deeper latch when infant showing cues. States that he just completed a feed. Nipple care reviewed with HE to air dry followed by application lanolin. Info given on Soothies with instruction to use according to package directions and place in the fridge while feeding. POC is for a circumcision soon. Encourage skin to skin often and to call for evaluation of the latch. MOB and FOB voice understanding.

## 2019-06-25 NOTE — LACTATION NOTE
This note was copied from a baby's chart.  MOB is latching infant with confidence. Infant was latch deep in cradle position belly up. See assessment. Minimal adjustment of positioning and review signs of a good latch and positioning for comfort. MOB reports understanding.

## 2019-06-25 NOTE — ANESTHESIA POSTPROCEDURE EVALUATION
Patient: Joann Medley    Procedure Summary     Date:  06/24/19 Room / Location:      Anesthesia Start:  1608 Anesthesia Stop:  1804    Procedure:  Labor Epidural Diagnosis:      Scheduled Providers:   Responsible Provider:  Luisito Devine M.D.    Anesthesia Type:  epidural ASA Status:  2          Final Anesthesia Type: epidural  Last vitals  BP   Blood Pressure: 128/78    Temp   36.7 °C (98 °F)    Pulse   Pulse: 78   Resp   18    SpO2   96 %      Anesthesia Post Evaluation      Airway patency: patent  Anesthetic complications: no  Cardiovascular status: hemodynamically stable  Respiratory status: acceptable  Hydration status: stable               Nurse Pain Score: 0 (NPRS)

## 2019-06-25 NOTE — PROGRESS NOTES
Pt arrived from floor via Wheelchair, received report from NKECHI Montalvo. Assessment complete VSS. Pt oriented to room, call light, emergency light, videos, bulb syringe, and placing baby on back to sleep. Call light within reach. Pt requested pain medications as needed.

## 2019-06-25 NOTE — L&D DELIVERY NOTE
SPONTANEOUS VAGINAL DELIVERY PRODEDURE NOTE    PATIENT ID:  NAME:  Joann Medley  MRN:               [id number]  YOB: 2000    On 2019 at 18:04, this 18 y.o., now 40w1d , GBS Neg female delivered via  under epidural anesthesia a viable female infant weighing 3190g with APGAR scores of 8 and 9 at one and five minutes.  Amidline episiotomy was performed and baby delivered immediately after. There was no nuchal cord and infant was bulb suctioned at delivery. Cord was doubly clamped, cut and infant handed to RN in attendance. An intact placenta was delivered spontaneously at 18:07 with 3 vessel cord. Upon vaginal exam, there was 2nd degree episiotomy was repaired using 3.0 chromic in the usual sterile fashion. Estimated blood loss was 400cc. Patient will be transferred to postpartum in stable condition and infant to  nursery.    Vinny Valdivia M.D.

## 2019-06-25 NOTE — CARE PLAN
Problem: Altered physiologic condition related to immediate post-delivery state and potential for bleeding/hemorrhage  Goal: Patient physiologically stable as evidenced by normal lochia, palpable uterine involution and vital signs within normal limits  Outcome: PROGRESSING AS EXPECTED  Fundus firm at umbilicus, lochia light, vital signs WNL.    Problem: Alteration in comfort related to episiotomy, vaginal repair and/or after birth pains  Goal: Patient verbalizes acceptable pain level  Outcome: PROGRESSING AS EXPECTED  Patient reports no pain.

## 2019-06-25 NOTE — DISCHARGE PLANNING
Discharge Planning Assessment Post Partum    Reason for Referral: Late prenatal care  Address: [address] [place] [address]  Phone: [phone]  Type of Living Situation: living with aunt, grandmother, and mother  Mom Diagnosis: Pregnancy  Baby Diagnosis: [place]  Primary Language: English    Name of Baby: Jesus Medley (: 19)  Father of the Baby: Not involved, lives in [facility]  Involved in baby’s care? No  Contact Information: N/A    Prenatal Care: Yes, at [facility] starting at 30 weeks.  Total of 7 visits.  Mom's PCP: Dr. Ellie Muhammad  PCP for new baby: Pediatrician list provided    Support System: MOB's family: aunt, mother, and grandmother  Coping/Bonding between mother & baby: Yes  Source of Feeding: breast feeding  Supplies for Infant: prepared for infant; denies any needs    Mom's Insurance: [place] Medicaid  Baby Covered on Insurance:Yes  Mother Employed/School:  at [email or web address]  Other children in the home/names & ages: 1st baby    Financial Hardship/Income: denies   Mom's Mental status: alert and oriented  Services used prior to admit: Medicaid and WIC ([facility])    CPS History: No  Psychiatric History: No  Domestic Violence History: No  Drug/ETOH History: No    Resources Provided: pediatrician list, children and family resource list, counseling resources for post partum depression, and contact information to JULIÁN Schmitz  Referrals Made: diaper bank referral provided     Clearance for Discharge: Infant is cleared to discharge home with mother    Ongoing Plan: Met with MOB to discuss late prenatal care.  MOB stated when she first found out she was pregnant she went to her PCP who told her she needed to establish with an OB.  When she called [facility] they scheduled her with the soonest available appointment which wasn't until April.  MOB denies any complications/drug use during pregnancy.

## 2019-06-26 VITALS
BODY MASS INDEX: 28.16 KG/M2 | OXYGEN SATURATION: 94 % | HEIGHT: 62 IN | DIASTOLIC BLOOD PRESSURE: 78 MMHG | TEMPERATURE: 98.4 F | HEART RATE: 82 BPM | RESPIRATION RATE: 16 BRPM | SYSTOLIC BLOOD PRESSURE: 121 MMHG | WEIGHT: 153 LBS

## 2019-06-26 PROCEDURE — A9270 NON-COVERED ITEM OR SERVICE: HCPCS | Performed by: FAMILY MEDICINE

## 2019-06-26 PROCEDURE — 700102 HCHG RX REV CODE 250 W/ 637 OVERRIDE(OP): Performed by: FAMILY MEDICINE

## 2019-06-26 RX ORDER — BISACODYL 10 MG
10 SUPPOSITORY, RECTAL RECTAL PRN
Qty: 5 SUPPOSITORY | Refills: 0 | Status: SHIPPED | OUTPATIENT
Start: 2019-06-26 | End: 2019-07-30

## 2019-06-26 RX ORDER — PSEUDOEPHEDRINE HCL 30 MG
100 TABLET ORAL 2 TIMES DAILY PRN
Qty: 60 CAP | Refills: 0 | Status: SHIPPED | OUTPATIENT
Start: 2019-06-26 | End: 2019-07-30

## 2019-06-26 RX ORDER — IBUPROFEN 600 MG/1
600 TABLET ORAL EVERY 6 HOURS PRN
Qty: 30 TAB | Refills: 0 | Status: SHIPPED | OUTPATIENT
Start: 2019-06-26 | End: 2019-07-30

## 2019-06-26 RX ADMIN — Medication 1 TABLET: at 07:07

## 2019-06-26 RX ADMIN — IBUPROFEN 600 MG: 600 TABLET ORAL at 01:10

## 2019-06-26 RX ADMIN — IBUPROFEN 600 MG: 600 TABLET ORAL at 07:07

## 2019-06-26 RX ADMIN — HYDROCODONE BITARTRATE AND ACETAMINOPHEN 1 TABLET: 5; 325 TABLET ORAL at 07:10

## 2019-06-26 RX ADMIN — IBUPROFEN 600 MG: 600 TABLET ORAL at 13:11

## 2019-06-26 NOTE — LACTATION NOTE
MOB Questions clock vs feeding infant on cue. Reports several feeding through the early morning hours and it has now bee 4.5 hours. Teach to feed on cue a minimum of 8x/24 hours and as often as infant shows cue with cluster feeding as a normal behavior. Instruct to get tracking kenji for feeds, stools, and voids. Hand expression into a spoon ( spraying out) and teach spoon feeding back if infant is sleepy and it has been 4-5 hours since a feeding. Infant became very alert and then latch to both breasts before discharge to home. Infant has been spitty; reinforce care during spitty episodes with demo by aunt as infant started spitting up. MOB has am appt to be seen by the PCP tomorrow and encouraged to attend the breastfeeding Circles with one on Saturday for reassurrance (new handout given with map and info). POC is to discharge to home feeding on cue a minimum of 8x/24 hours and to follow up as discussed.

## 2019-06-26 NOTE — DISCHARGE INSTRUCTIONS
POSTPARTUM DISCHARGE INSTRUCTIONS FOR MOM    YOB: 2000   Age: 18 y.o.               Admit Date: 2019     Discharge Date: 2019  Attending Doctor:  Trent Hickey M.D.                  Allergies:  Patient has no known allergies.    Discharged to home by car. Discharged via wheelchair, hospital escort: Yes.  Special equipment needed: Not Applicable  Belongings with: Personal  Be sure to schedule a follow-up appointment with your primary care doctor or any specialists as instructed.     Discharge Plan:   Diet Plan: Discussed  Activity Level: Discussed  Confirmed Follow up Appointment: Patient to Call and Schedule Appointment  Confirmed Symptoms Management: Discussed  Medication Reconciliation Updated: Yes  Influenza Vaccine Indication: Indicated: Not available from distributor/    REASONS TO CALL YOUR OBSTETRICIAN:  1.   Persistent fever or shaking chills (Temperature higher than 100.4)  2.   Heavy bleeding (soaking more than 1 pad per hour); Passing clots  3.   Foul odor from vagina  4.   Mastitis (Breast infection; breast pain, chills, fever, redness)  5.   Urinary pain, burning or frequency  6.   Episiotomy infection  7.   Abdominal incision infection  8.   Severe depression longer than 24 hours    HAND WASHING  · Prior to handling the baby.  · Before breastfeeding or bottle feeding baby.  · After using the bathroom or changing the baby's diaper.    WOUND CARE  Ask your physician for additional care instructions.  In general:    ·  Incision:      · Keep clean and dry.    · Do NOT lift anything heavier than your baby for up to 6 weeks.    · There should not be any opening or pus.      VAGINAL CARE  · Nothing inside vagina for 6 weeks: no sexual intercourse, tampons or douching.  · Bleeding may continue for 2-4 weeks.  Amount may vary.    · Call your physician for heavy bleeding which means soaking more than 1 pad per hour    BIRTH CONTROL  · It is possible to become pregnant  "at any time after delivery and while breastfeeding.  · Plan to discuss a method of birth control with your physician at your follow up visit. visit.    DIET AND ELIMINATION  · Eating more fiber (bran cereal, fruits, and vegetables) and drinking plenty of fluids will help to avoid constipation.  · Urinary frequency after childbirth is normal.    POSTPARTUM BLUES  During the first few days after birth, you may experience a sense of the \"blues\" which may include impatience, irritability or even crying.  These feeling come and go quickly.  However, as many as 1 in 10 women experience emotional symptoms known as postpartum depression.    Postpartum depression:  May start as early as the second or third day after delivery or take several weeks or months to develop.  Symptoms of \"blues\" are present, but are more intense:  Crying spells; loss of appetite; feelings of hopelessness or loss of control; fear of touching the baby; over concern or no concern at all about the baby; little or no concern about your own appearance/caring for yourself; and/or inability to sleep or excessive sleeping.  Contact your physician if you are experiencing any of these symptoms.    Crisis Hotline:  · [place] Crisis Hotline:  3-402-EUOJBWO  Or 1-149.883.4816  · [place] Crisis Hotline:  1-958.872.6673  Or 305-989-5265    PREVENTING SHAKEN BABY:  If you are angry or stressed, PUT THE BABY IN THE CRIB, step away, take some deep breaths, and wait until you are calm to care for the baby.  DO NOT SHAKE THE BABY.  You are not alone, call a supporter for help.    · Crisis Call Center 24/7 crisis line 488-474-0760 or 1-166.816.1663  · You can also text them, text \"ANSWER\" to 906253    QUIT SMOKING/TOBACCO USE:  I understand the use of any tobacco products increases my chance of suffering from future heart disease and could cause other illnesses which may shorten my life. Quitting the use of tobacco products is the single most important thing I can do to " improve my health. For further information on smoking / tobacco cessation call a Toll Free Quit Line at 1-776.721.7622 (*National Cancer [place]) or 1-469.380.3860 (American Lung Association) or you can access the web based program at www.lungusa.org.    · [place] Tobacco Users Help Line:  (779) 662-6919       Toll Free: 1-989.647.5176  · Quit Tobacco Program [facility] (999)509-8353    DEPRESSION / SUICIDE RISK:  As you are discharged from this RUST, it is important to learn how to keep safe from harming yourself.    Recognize the warning signs:  · Abrupt changes in personality, positive or negative- including increase in energy   · Giving away possessions  · Change in eating patterns- significant weight changes-  positive or negative  · Change in sleeping patterns- unable to sleep or sleeping all the time   · Unwillingness or inability to communicate  · Depression  · Unusual sadness, discouragement and loneliness  · Talk of wanting to die  · Neglect of personal appearance   · Rebelliousness- reckless behavior  · Withdrawal from people/activities they love  · Confusion- inability to concentrate     If you or a loved one observes any of these behaviors or has concerns about self-harm, here's what you can do:  · Talk about it- your feelings and reasons for harming yourself  · Remove any means that you might use to hurt yourself (examples: pills, rope, extension cords, firearm)  · Get professional help from the community (Mental Health, Substance Abuse, psychological counseling)  · Do not be alone:Call your Safe Contact- someone whom you trust who will be there for you.  · Call your local CRISIS HOTLINE 795-7972 or 984-030-4944  · Call your local Children's Mobile Crisis Response Team [place] (715) 238-9023 or www.tribr  · Call the toll free National Suicide Prevention Hotlines   · National Suicide Prevention Lifeline 679-168-SMEB (9669)  · National Hope Line  [facility] 800-SUICIDE (332-9249)    DISCHARGE SURVEY:  Thank you for choosing [facility].  We hope we provided you with very good care.  You may be receiving a survey in the mail.  Please fill it out.  Your opinion is valuable to us.    ADDITIONAL EDUCATIONAL MATERIALS GIVEN TO PATIENT:        My signature on this form indicates that:  1.  I have reviewed and understand the above information  2.  My questions regarding this information have been answered to my satisfaction.  3.  I have formulated a plan with my discharge nurse to obtain my prescribed medication for home.

## 2019-06-26 NOTE — DISCHARGE SUMMARY
Discharge Summary:     Date of Admission: 2019  Date of Discharge: 18      Admitting diagnosis:    1. Pregnancy @ 40w1d      Discharge Diagnosis:   1. Status post vaginal, spontaneous.    Pregnancy Complications: none  Tubal Ligation:  N\A    Past Medical History:   Diagnosis Date   • Anemia in pregnancy 2019     OB History    Para Term  AB Living   1 1 1     1   SAB TAB Ectopic Molar Multiple Live Births           0 1      # Outcome Date GA Lbr Kyle/2nd Weight Sex Delivery Anes PTL Lv   1 Term 19 40w1d  3.19 kg (7 lb 0.5 oz) F Vag-Spont EPI  KATERINA        History reviewed. No pertinent surgical history.  Patient has no known allergies.    Patient Active Problem List   Diagnosis   • Supervision of first pregnancy    • Late prenatal care affecting pregnancy   • Anemia in pregnancy   •  (normal spontaneous vaginal delivery)       Hospital Course:   38 y.o. , now para 1, was admitted with the above mentioned diagnosis, underwent Active Labor, vaginal, spontaneous. Pt gave birth to baby girl with APGARs of 8/9 and weight 3190g.  Patient's postpartum course was unremarkable, with progressive advancement in diet , ambulation and toleration of oral analgesia. Patient without complaints today and desires discharge.  For postpartum contraception, pt desires IUD at postpartum visit.     Physical Exam:  Temp:  [36.6 °C (97.9 °F)-36.9 °C (98.4 °F)] 36.9 °C (98.4 °F)  Pulse:  [] 82  Resp:  [16] 16  BP: (121-129)/(78-95) 121/78  SpO2:  [94 %-95 %] 94 %  Physical Exam  General: well  Chest/Breasts: nipples intact and breasts soft   Abdomen: nontender, soft, non-distended  Fundus: firm and below umbilicus  Incision: not applicable, (vaginal delivery)  Perineum: well approximated and well healing  Extremities: symmetric, calves nontender    Current Facility-Administered Medications   Medication Dose   • oxytocin (PITOCIN) infusion (for postpartum)   mL/hr   • ibuprofen  (MOTRIN) tablet 600 mg  600 mg   • HYDROcodone-acetaminophen (NORCO) 5-325 MG per tablet 1 Tab  1 Tab   • HYDROcodone/acetaminophen (NORCO)  MG per tablet 1 Tab  1 Tab   • LR infusion     • PRN oxytocin (PITOCIN) (20 Units/1000 mL) PRN for excessive uterine bleeding - See Admin Instr  125-999 mL/hr   • miSOPROStol (CYTOTEC) tablet 600 mcg  600 mcg   • methylergonovine (METHERGINE) injection 0.2 mg  0.2 mg   • carboPROST (HEMABATE) injection 250 mcg  250 mcg   • docusate sodium (COLACE) capsule 100 mg  100 mg   • bisacodyl (DULCOLAX) suppository 10 mg  10 mg   • magnesium hydroxide (MILK OF MAGNESIA) suspension 30 mL  30 mL   • prenatal plus vitamin (STUARTNATAL 1+1) 27-1 MG tablet 1 Tab  1 Tab       Recent Labs      06/24/19   1515  06/25/19   0459   WBC  4.4*  8.6   RBC  4.67  3.67*   HEMOGLOBIN  13.6  10.1*   HEMATOCRIT  40.7  32.2*   MCV  87.2  87.7   MCH  29.1  27.5   MCHC  33.4*  31.4*   RDW  51.4*  52.7*   PLATELETCT  145*  128*   MPV  12.2  12.1       Activity:   Discharge to home  Pelvic Rest x 6 weeks  Call or come to ED for: heavy vaginal bleeding, fever >100.4, severe abdominal pain, severe headache, chest pain, shortness of breath,  N/V, incisional drainage, or other concerns.      Assessment:  normal postpartum course     [facility] [place] [facility] in 5 weeks for vaginal delivery     Discharge Instructions:  Pelvic rest x 6 weeks  No heavy lifting until cleared by physician  Return to ED or come to the office for severe headache, shortness of breath, chest pain, heavy vaginal bleeding, incisional drainage, foul smelling vaginal discharge, or fever >100.4     Discharge Meds:   Current Outpatient Prescriptions   Medication Sig Dispense Refill   • bisacodyl (DULCOLAX) 10 MG Suppos Insert 1 Suppository in rectum as needed (Constipation, if docusate sodium ineffective or not ordered, but not if 4th degree laceration). 5 Suppository 0   • docusate sodium 100 MG Cap Take 100 mg by mouth 2  times a day as needed for Constipation. 60 Cap 0   • ibuprofen (MOTRIN) 600 MG Tab Take 1 Tab by mouth every 6 hours as needed (For cramping after delivery; do not give if patient is receiving ketorolac (Toradol)). 30 Tab 0

## 2019-06-26 NOTE — CARE PLAN
Problem: Altered physiologic condition related to immediate post-delivery state and potential for bleeding/hemorrhage  Goal: Patient physiologically stable as evidenced by normal lochia, palpable uterine involution and vital signs within normal limits  Outcome: PROGRESSING AS EXPECTED  Patient is physiologically stable as evidenced by light lochia rubra, firm fundus one fingerbreadth below the umbilicus, and vital signs WDL. Will continue to monitor patient condition.     Problem: Alteration in comfort related to episiotomy, vaginal repair and/or after birth pains  Goal: Patient is able to ambulate, care for self and infant  Outcome: PROGRESSING AS EXPECTED  Discussed pain management and verbalization of pain utilizing the 0-10 pain scale. White board updated with times of pain medication availability and pt requests pain medication be provided as available. Discussed non-pharmacological pain control therapies and pt provided perineal ice packs, Tucks, and lidocaine spray per request. Pt ambulates with a steady gait and demonstrates the ability to participate in ADLs and provide care for  daughter.

## 2019-06-27 NOTE — PROGRESS NOTES
Discharge instructions reviewed by Zahraa ARBOLEDA. Questions answered. Lactation will see patient prior to discharge.

## 2019-07-02 ENCOUNTER — TELEPHONE (OUTPATIENT)
Dept: OBGYN | Facility: CLINIC | Age: 19
End: 2019-07-02

## 2019-07-02 NOTE — TELEPHONE ENCOUNTER
Pt walked in to clinic requesting a letter to return to work after  on 19.  Went back to work on 19 but was told that she needs a letter, denies any complications, states she is able to pump at work, she does  and not lifting is required.   Consulted w/GERARDO Kaplan CNM. Advised to bring her in to see her to be able to provide letter, (15 min appt is fine) unable to see her today due to schedule full  Scheduled on 7/3/19 @ 1015.    Pt agreed to plan

## 2019-07-03 ENCOUNTER — POST PARTUM (OUTPATIENT)
Dept: OBGYN | Facility: CLINIC | Age: 19
End: 2019-07-03
Payer: MEDICAID

## 2019-07-03 VITALS — SYSTOLIC BLOOD PRESSURE: 124 MMHG | BODY MASS INDEX: 24.69 KG/M2 | WEIGHT: 135 LBS | DIASTOLIC BLOOD PRESSURE: 72 MMHG

## 2019-07-03 PROCEDURE — 0503F POSTPARTUM CARE VISIT: CPT | Performed by: NURSE PRACTITIONER

## 2019-07-03 ASSESSMENT — EDINBURGH POSTNATAL DEPRESSION SCALE (EPDS)
I HAVE BEEN ABLE TO LAUGH AND SEE THE FUNNY SIDE OF THINGS: AS MUCH AS I ALWAYS COULD
I HAVE BEEN SO UNHAPPY THAT I HAVE HAD DIFFICULTY SLEEPING: NOT AT ALL
I HAVE FELT SAD OR MISERABLE: NO, NOT AT ALL
THINGS HAVE BEEN GETTING ON TOP OF ME: YES, SOMETIMES I HAVEN'T BEEN COPING AS WELL AS USUAL
I HAVE BEEN SO UNHAPPY THAT I HAVE BEEN CRYING: ONLY OCCASIONALLY
TOTAL SCORE: 5
THE THOUGHT OF HARMING MYSELF HAS OCCURRED TO ME: NEVER
I HAVE FELT SCARED OR PANICKY FOR NO GOOD REASON: NO, NOT AT ALL
I HAVE LOOKED FORWARD WITH ENJOYMENT TO THINGS: AS MUCH AS I EVER DID
I HAVE BLAMED MYSELF UNNECESSARILY WHEN THINGS WENT WRONG: YES, SOME OF THE TIME
I HAVE BEEN ANXIOUS OR WORRIED FOR NO GOOD REASON: NO, NOT AT ALL

## 2019-07-03 NOTE — LETTER
July 3, 2019      Joann Medley is now postpartum and being cared for by [facility]. She is cleared to return to work.         Thank you,          SAULO Jane.    Electronically Signed

## 2019-07-03 NOTE — PROGRESS NOTES
Pt needs a letter to return to work. She is feeling fine with her stitches in the midline epis, no pain or issues. She works as at  of [employer] and will be standing and sitting but not doing anything too vigorous physically. Will be coming back on 8/5 for full PP exam and is ok to abstain from intercourse until that time, does not desire BCM today.     Cleared to return to work as long as pt feels ready  To listen to body and not over do it

## 2019-07-03 NOTE — PROGRESS NOTES
Pt here today for postpartum exam.  Delivery Date: 06/24/2019  Breast feeding only  BCM: IUD  LMP: not yet  WT: 135 lb  BP: 124/72  Pt states she is here to get a return to work letter, states she is still  planning on coming back to her actual PP appt exam on 08/05/2019  Pt states no complaints or concerns today  Good ph: [phone]

## 2019-07-19 ENCOUNTER — TELEPHONE (OUTPATIENT)
Dept: OBGYN | Facility: CLINIC | Age: 19
End: 2019-07-19

## 2019-07-19 NOTE — TELEPHONE ENCOUNTER
Pt called re: FMLA forms done.  Was informed that her forms were faxed this morning to [facility].  Has not further questions.

## 2019-07-30 ENCOUNTER — APPOINTMENT (OUTPATIENT)
Dept: RADIOLOGY | Facility: MEDICAL CENTER | Age: 19
End: 2019-07-30
Attending: EMERGENCY MEDICINE
Payer: MEDICAID

## 2019-07-30 ENCOUNTER — HOSPITAL ENCOUNTER (EMERGENCY)
Facility: MEDICAL CENTER | Age: 19
End: 2019-07-30
Attending: EMERGENCY MEDICINE
Payer: MEDICAID

## 2019-07-30 ENCOUNTER — APPOINTMENT (OUTPATIENT)
Dept: RADIOLOGY | Facility: MEDICAL CENTER | Age: 19
End: 2019-07-30
Payer: MEDICAID

## 2019-07-30 VITALS
TEMPERATURE: 98.1 F | SYSTOLIC BLOOD PRESSURE: 144 MMHG | HEIGHT: 62 IN | HEART RATE: 120 BPM | RESPIRATION RATE: 29 BRPM | OXYGEN SATURATION: 95 % | DIASTOLIC BLOOD PRESSURE: 90 MMHG | WEIGHT: 133.82 LBS | BODY MASS INDEX: 24.63 KG/M2

## 2019-07-30 DIAGNOSIS — N39.0 ACUTE UTI: ICD-10-CM

## 2019-07-30 LAB
ALBUMIN SERPL BCP-MCNC: 3.7 G/DL (ref 3.2–4.9)
ALBUMIN/GLOB SERPL: 1.2 G/DL
ALP SERPL-CCNC: 63 U/L (ref 30–99)
ALT SERPL-CCNC: 27 U/L (ref 2–50)
ANION GAP SERPL CALC-SCNC: 11 MMOL/L (ref 0–11.9)
APPEARANCE UR: CLEAR
AST SERPL-CCNC: 36 U/L (ref 12–45)
BACTERIA #/AREA URNS HPF: ABNORMAL /HPF
BASOPHILS # BLD AUTO: 0.2 % (ref 0–1.8)
BASOPHILS # BLD: 0.01 K/UL (ref 0–0.12)
BILIRUB SERPL-MCNC: 0.9 MG/DL (ref 0.1–1.5)
BILIRUB UR QL STRIP.AUTO: NEGATIVE
BUN SERPL-MCNC: 10 MG/DL (ref 8–22)
CALCIUM SERPL-MCNC: 9.7 MG/DL (ref 8.4–10.2)
CHLORIDE SERPL-SCNC: 106 MMOL/L (ref 96–112)
CO2 SERPL-SCNC: 18 MMOL/L (ref 20–33)
COLOR UR: YELLOW
CREAT SERPL-MCNC: 1.08 MG/DL (ref 0.5–1.4)
EOSINOPHIL # BLD AUTO: 0.01 K/UL (ref 0–0.51)
EOSINOPHIL NFR BLD: 0.2 % (ref 0–6.9)
EPI CELLS #/AREA URNS HPF: ABNORMAL /HPF
ERYTHROCYTE [DISTWIDTH] IN BLOOD BY AUTOMATED COUNT: 42.5 FL (ref 35.9–50)
GLOBULIN SER CALC-MCNC: 3.2 G/DL (ref 1.9–3.5)
GLUCOSE SERPL-MCNC: 92 MG/DL (ref 65–99)
GLUCOSE UR STRIP.AUTO-MCNC: NEGATIVE MG/DL
HCT VFR BLD AUTO: 40.1 % (ref 37–47)
HGB BLD-MCNC: 12.7 G/DL (ref 12–16)
IMM GRANULOCYTES # BLD AUTO: 0.01 K/UL (ref 0–0.11)
IMM GRANULOCYTES NFR BLD AUTO: 0.2 % (ref 0–0.9)
KETONES UR STRIP.AUTO-MCNC: NEGATIVE MG/DL
LACTATE BLD-SCNC: 2.3 MMOL/L (ref 0.5–2)
LEUKOCYTE ESTERASE UR QL STRIP.AUTO: ABNORMAL
LYMPHOCYTES # BLD AUTO: 0.43 K/UL (ref 1–4.8)
LYMPHOCYTES NFR BLD: 7.9 % (ref 22–41)
MCH RBC QN AUTO: 27.8 PG (ref 27–33)
MCHC RBC AUTO-ENTMCNC: 31.7 G/DL (ref 33.6–35)
MCV RBC AUTO: 87.7 FL (ref 81.4–97.8)
MICRO URNS: ABNORMAL
MONOCYTES # BLD AUTO: 0.15 K/UL (ref 0–0.85)
MONOCYTES NFR BLD AUTO: 2.8 % (ref 0–13.4)
NEUTROPHILS # BLD AUTO: 4.8 K/UL (ref 2–7.15)
NEUTROPHILS NFR BLD: 88.7 % (ref 44–72)
NITRITE UR QL STRIP.AUTO: NEGATIVE
NRBC # BLD AUTO: 0 K/UL
NRBC BLD-RTO: 0 /100 WBC
PH UR STRIP.AUTO: 6 [PH] (ref 5–8)
PLATELET # BLD AUTO: 156 K/UL (ref 164–446)
PMV BLD AUTO: 11 FL (ref 9–12.9)
POTASSIUM SERPL-SCNC: 3 MMOL/L (ref 3.6–5.5)
PROT SERPL-MCNC: 6.9 G/DL (ref 6–8.2)
PROT UR QL STRIP: NEGATIVE MG/DL
RBC # BLD AUTO: 4.57 M/UL (ref 4.2–5.4)
RBC # URNS HPF: ABNORMAL /HPF
RBC UR QL AUTO: NEGATIVE
SODIUM SERPL-SCNC: 135 MMOL/L (ref 135–145)
SP GR UR STRIP.AUTO: 1.01
WBC # BLD AUTO: 5.4 K/UL (ref 4.8–10.8)
WBC #/AREA URNS HPF: ABNORMAL /HPF

## 2019-07-30 PROCEDURE — 85025 COMPLETE CBC W/AUTO DIFF WBC: CPT

## 2019-07-30 PROCEDURE — 700105 HCHG RX REV CODE 258: Performed by: EMERGENCY MEDICINE

## 2019-07-30 PROCEDURE — 700111 HCHG RX REV CODE 636 W/ 250 OVERRIDE (IP): Performed by: EMERGENCY MEDICINE

## 2019-07-30 PROCEDURE — 87086 URINE CULTURE/COLONY COUNT: CPT

## 2019-07-30 PROCEDURE — 83605 ASSAY OF LACTIC ACID: CPT

## 2019-07-30 PROCEDURE — 81001 URINALYSIS AUTO W/SCOPE: CPT

## 2019-07-30 PROCEDURE — 87040 BLOOD CULTURE FOR BACTERIA: CPT | Mod: 91

## 2019-07-30 PROCEDURE — 36415 COLL VENOUS BLD VENIPUNCTURE: CPT

## 2019-07-30 PROCEDURE — A9270 NON-COVERED ITEM OR SERVICE: HCPCS | Performed by: EMERGENCY MEDICINE

## 2019-07-30 PROCEDURE — 96375 TX/PRO/DX INJ NEW DRUG ADDON: CPT

## 2019-07-30 PROCEDURE — 700102 HCHG RX REV CODE 250 W/ 637 OVERRIDE(OP): Performed by: EMERGENCY MEDICINE

## 2019-07-30 PROCEDURE — 80053 COMPREHEN METABOLIC PANEL: CPT

## 2019-07-30 PROCEDURE — 71045 X-RAY EXAM CHEST 1 VIEW: CPT

## 2019-07-30 PROCEDURE — 99285 EMERGENCY DEPT VISIT HI MDM: CPT

## 2019-07-30 PROCEDURE — 96365 THER/PROPH/DIAG IV INF INIT: CPT

## 2019-07-30 RX ORDER — IBUPROFEN 600 MG/1
600 TABLET ORAL ONCE
Status: COMPLETED | OUTPATIENT
Start: 2019-07-30 | End: 2019-07-30

## 2019-07-30 RX ORDER — ACETAMINOPHEN 325 MG/1
650 TABLET ORAL ONCE
Status: COMPLETED | OUTPATIENT
Start: 2019-07-30 | End: 2019-07-30

## 2019-07-30 RX ORDER — AMOXICILLIN AND CLAVULANATE POTASSIUM 875; 125 MG/1; MG/1
1 TABLET, FILM COATED ORAL 2 TIMES DAILY
Qty: 14 TAB | Refills: 0 | Status: SHIPPED | OUTPATIENT
Start: 2019-07-30 | End: 2019-08-06

## 2019-07-30 RX ORDER — SODIUM CHLORIDE 9 MG/ML
30 INJECTION, SOLUTION INTRAVENOUS ONCE
Status: COMPLETED | OUTPATIENT
Start: 2019-07-30 | End: 2019-07-30

## 2019-07-30 RX ORDER — ACETAMINOPHEN 500 MG
500 TABLET ORAL EVERY 6 HOURS PRN
Status: SHIPPED | COMMUNITY
End: 2020-12-08

## 2019-07-30 RX ORDER — MORPHINE SULFATE 4 MG/ML
4 INJECTION, SOLUTION INTRAMUSCULAR; INTRAVENOUS ONCE
Status: COMPLETED | OUTPATIENT
Start: 2019-07-30 | End: 2019-07-30

## 2019-07-30 RX ORDER — ONDANSETRON 2 MG/ML
4 INJECTION INTRAMUSCULAR; INTRAVENOUS ONCE
Status: COMPLETED | OUTPATIENT
Start: 2019-07-30 | End: 2019-07-30

## 2019-07-30 RX ORDER — ONDANSETRON 4 MG/1
4 TABLET, ORALLY DISINTEGRATING ORAL EVERY 8 HOURS PRN
Qty: 10 TAB | Refills: 0 | Status: SHIPPED | OUTPATIENT
Start: 2019-07-30 | End: 2019-11-14

## 2019-07-30 RX ADMIN — SODIUM CHLORIDE 1821 ML: 9 INJECTION, SOLUTION INTRAVENOUS at 09:14

## 2019-07-30 RX ADMIN — IBUPROFEN 600 MG: 600 TABLET ORAL at 10:57

## 2019-07-30 RX ADMIN — MORPHINE SULFATE 4 MG: 4 INJECTION INTRAVENOUS at 09:14

## 2019-07-30 RX ADMIN — AMPICILLIN SODIUM AND SULBACTAM SODIUM 3 G: 2; 1 INJECTION, POWDER, FOR SOLUTION INTRAMUSCULAR; INTRAVENOUS at 10:41

## 2019-07-30 RX ADMIN — ACETAMINOPHEN 650 MG: 325 TABLET, FILM COATED ORAL at 09:14

## 2019-07-30 RX ADMIN — ONDANSETRON 4 MG: 2 INJECTION INTRAMUSCULAR; INTRAVENOUS at 09:14

## 2019-07-30 ASSESSMENT — PAIN DESCRIPTION - DESCRIPTORS: DESCRIPTORS: PRESSURE

## 2019-07-30 NOTE — ED NOTES
Med rec updated and complete  Allergies reviewed  Interviewed pt with mother at bedside with permission from pt.  Pt reports no prescription medications.  Pt reports no antibiotics in the last 2 weeks

## 2019-07-30 NOTE — ED NOTES
Released with all follow-up and 2 RX, advise this pt to seek care with her PCP in follow-up or return with any changes or concerns..

## 2019-07-30 NOTE — ED PROVIDER NOTES
"ED Provider Note    CHIEF COMPLAINT  Chief Complaint   Patient presents with   • Headache     Since waking this morning.    • Breast Pain     Rt sided since this morning.   • Shortness of Breath     \" like I have to cough really hard to breathe\".   • Chest Pain     Central chest pain this morning.        [facility]  Joann Medley is a 18 y.o. female who presents with multiple complaints.  She is 3 weeks status post normal spontaneous vaginal delivery.  She states she is been doing very well until this morning.  She awoke with right breast pain and shortness of breath.  She is also complaining of a headache.  When she went to bed last night she felt absolutely fine.  She is had a little bit of a cough.  She denies any vaginal discharge.  She states she has been urinating more frequently than usual.  She meets sepsis criteria on arrival therefore the sepsis protocol is initiated.    REVIEW OF SYSTEMS  See HPI for further details. All other systems negative.    PAST MEDICAL HISTORY  Past Medical History:   Diagnosis Date   • Anemia in pregnancy 4/20/2019       FAMILY HISTORY  Family History   Problem Relation Age of Onset   • Cancer Paternal Grandmother         ?       SOCIAL HISTORY  Social History     Social History   • Marital status: Single     Spouse name: N/A   • Number of children: N/A   • Years of education: N/A     Social History Main Topics   • Smoking status: Never Smoker   • Smokeless tobacco: Never Used   • Alcohol use No   • Drug use: No   • Sexual activity: Yes     Partners: Male     Birth control/ protection: Pill      Comment: Unplanned pregnanacy. Pt states was pregnant while on the pill     Other Topics Concern   • Behavioral Problems No   • Interpersonal Relationships No   • Sad Or Not Enjoying Activities No   • Suicidal Thoughts No   • Poor School Performance No   • Reading Difficulties No   • Speech Difficulties No   • Writing Difficulties No   • Inadequate Sleep No   • Excessive Tv Viewing " "No   • Excessive Video Game Use No   • Inadequate Exercise No   • Sports Related No   • Poor Diet Yes   • Family Concerns For Drug/Alcohol Abuse No   • Poor Oral Hygiene No   • Bike Safety Yes   • Family Concerns Vehicle Safety No     Social History Narrative   • No narrative on file       SURGICAL HISTORY  History reviewed. No pertinent surgical history.    CURRENT MEDICATIONS  Home Medications     Reviewed by Lyn Chauhan R.N. (Registered Nurse) on 07/30/19 at 0846  Med List Status: Not Addressed   Medication Last Dose Status   bisacodyl (DULCOLAX) 10 MG Suppos  Active   docusate sodium 100 MG Cap  Active   ferrous sulfate 325 (65 Fe) MG tablet  Active   ibuprofen (MOTRIN) 600 MG Tab  Active   Prenatal MV-Min-Fe Fum-FA-DHA (PRENATAL 1 PO)  Active                ALLERGIES  No Known Allergies    PHYSICAL EXAM  VITAL SIGNS: /90   Pulse (!) 134   Temp (!) 40.2 °C (104.3 °F) (Oral)   Resp 16   Ht 1.575 m (5' 2\")   Wt 60.7 kg (133 lb 13.1 oz)   LMP 09/16/2018   SpO2 98%   Breastfeeding? Yes   BMI 24.48 kg/m²   Constitutional: Well developed, Well nourished, No acute distress, Non-toxic appearance.   HENT: Normocephalic, Atraumatic.  Eyes:  EOMI, Conjunctiva normal, No discharge.   Cardiovascular: Tachycardic, Normal rhythm, No murmurs, No rubs, No gallops.   Thorax & Lungs: Clear to auscultation without wheezes, rales, or rhonchi.  Right breast is engorged.  No real erythema and only minimal tenderness.  She is leaking milk.  Abdomen: Soft and nontender.  Skin: Warm, Dry.  Musculoskeletal: Good range of motion in all major joints.   Neurologic: Awake and alert, No focal deficits noted.            RADIOLOGY/PROCEDURES  DX-CHEST-PORTABLE (1 VIEW)   Final Result         1. No acute cardiopulmonary abnormalities are identified.            COURSE & MEDICAL DECISION MAKING  Pertinent Labs & Imaging studies reviewed. (See chart for details)  Is a 18-year-old here for evaluation of breast pain and fever " approximately 3 weeks postpartum.  She had a normal spontaneous vaginal delivery.  She was well until this morning when she awoke up feeling the right wrist pain.  She states her breast was swollen and tender.  She also had a fever.  Upon arrival she meets sepsis criteria and sepsis protocol is initiated.  She is hydrated with normal saline.  She was tachycardic on arrival with a fever of 104.  CBC shows a normal white count and differential of 88 polys and 7 lymphocytes.  Chemistries are normal with the exception of a potassium being low at 3.0 and bicarb being minimally low at 18.  Lactate is minimally elevated at 2.3.  Chest x-ray shows no acute cardiopulmonary processes.  Urine is positive for leukocyte esterase and differential with 5-10 WBCs, 2-5 RBCs and few bacteria.  The patient I believe does have a urinary tract infection.  She also may have mastitis and I therefore elected to treat her with Unasyn IV.  Upon repeat evaluation her resting heart rate has come down to 111 and she is been treated with antipyretics.  She states she is feeling very much better at this time.  I do not think she requires acute hospitalization.  I will start her on Augmentin.  She has follow-up with her OB/GYN next week.  She is instructed return to the emergency department immediately if she is not doing well for any reason.  She is instructed to take Tylenol and/or Motrin for her fevers.  She is to drink plenty of fluids.  She is given a discharge instruction sheet on mastitis, breast-feeding with mastitis, and urinary tract infections.    FINAL IMPRESSION  1.  Acute UTI  2.  Postpartum mastitis  3.  Fever         Electronically signed by: Herrera Ramon, 7/30/2019 8:59 AM

## 2019-07-30 NOTE — ED NOTES
Iv placed , labs  bld cx x 1 drawn and taken to lab. poc update given to pt, results pending at this time

## 2019-07-30 NOTE — ED TRIAGE NOTES
"Chief Complaint   Patient presents with   • Headache     Since waking this morning.    • Breast Pain     Rt sided since this morning.   • Shortness of Breath     \" like I have to cough really hard to breathe\".   • Chest Pain     Central chest pain this morning.      Pt breastfeeding.   PMH:Anemia  Temp 104.3 f, heart rate : 139  "

## 2019-08-01 LAB
BACTERIA UR CULT: NORMAL
SIGNIFICANT IND 70042: NORMAL
SITE SITE: NORMAL
SOURCE SOURCE: NORMAL

## 2019-08-04 LAB
BACTERIA BLD CULT: NORMAL
BACTERIA BLD CULT: NORMAL
SIGNIFICANT IND 70042: NORMAL
SIGNIFICANT IND 70042: NORMAL
SITE SITE: NORMAL
SITE SITE: NORMAL
SOURCE SOURCE: NORMAL
SOURCE SOURCE: NORMAL

## 2019-08-05 ENCOUNTER — POST PARTUM (OUTPATIENT)
Dept: OBGYN | Facility: CLINIC | Age: 19
End: 2019-08-05
Payer: MEDICAID

## 2019-08-05 VITALS — BODY MASS INDEX: 23.23 KG/M2 | WEIGHT: 127 LBS | DIASTOLIC BLOOD PRESSURE: 80 MMHG | SYSTOLIC BLOOD PRESSURE: 120 MMHG

## 2019-08-05 PROBLEM — O99.019 ANEMIA IN PREGNANCY: Status: RESOLVED | Noted: 2019-04-20 | Resolved: 2019-08-05

## 2019-08-05 PROBLEM — O09.30 LATE PRENATAL CARE AFFECTING PREGNANCY: Status: RESOLVED | Noted: 2019-04-18 | Resolved: 2019-08-05

## 2019-08-05 PROCEDURE — 0503F POSTPARTUM CARE VISIT: CPT | Performed by: PHYSICIAN ASSISTANT

## 2019-08-05 ASSESSMENT — ENCOUNTER SYMPTOMS
DEPRESSION: 0
RESPIRATORY NEGATIVE: 1
CARDIOVASCULAR NEGATIVE: 1
NEUROLOGICAL NEGATIVE: 1
CONSTITUTIONAL NEGATIVE: 1
GASTROINTESTINAL NEGATIVE: 1
EYES NEGATIVE: 1
MUSCULOSKELETAL NEGATIVE: 1
PSYCHIATRIC NEGATIVE: 1

## 2019-08-05 ASSESSMENT — EDINBURGH POSTNATAL DEPRESSION SCALE (EPDS)
I HAVE BLAMED MYSELF UNNECESSARILY WHEN THINGS WENT WRONG: YES, SOME OF THE TIME
I HAVE FELT SCARED OR PANICKY FOR NO GOOD REASON: NO, NOT AT ALL
TOTAL SCORE: 8
THINGS HAVE BEEN GETTING ON TOP OF ME: YES, SOMETIMES I HAVEN'T BEEN COPING AS WELL AS USUAL
THE THOUGHT OF HARMING MYSELF HAS OCCURRED TO ME: NEVER
I HAVE BEEN ANXIOUS OR WORRIED FOR NO GOOD REASON: NO, NOT AT ALL
I HAVE LOOKED FORWARD WITH ENJOYMENT TO THINGS: AS MUCH AS I EVER DID
I HAVE BEEN SO UNHAPPY THAT I HAVE HAD DIFFICULTY SLEEPING: NOT VERY OFTEN
I HAVE FELT SAD OR MISERABLE: YES, QUITE OFTEN
I HAVE BEEN ABLE TO LAUGH AND SEE THE FUNNY SIDE OF THINGS: AS MUCH AS I ALWAYS COULD
I HAVE BEEN SO UNHAPPY THAT I HAVE BEEN CRYING: ONLY OCCASIONALLY

## 2019-08-05 NOTE — PROGRESS NOTES
Subjective:      Joann Medley is a 18 y.o. female who presents with Postpartum visit today. 5wk s/p  at term without complications. Pt has no complaints- denies heavy vaginal bleeding, depression, intercourse, pain or problems with BF. Pt took Amoxicillin for mastitis last week. PAP to be started at 19yo. BCM desired is Mirena IUD - consent signed today, referral placed and pt to RTC 2 wk for IUD.          HPI    Review of Systems   Constitutional: Negative.    HENT: Negative.    Eyes: Negative.    Respiratory: Negative.    Cardiovascular: Negative.    Gastrointestinal: Negative.    Genitourinary: Negative.    Musculoskeletal: Negative.    Skin: Negative.    Neurological: Negative.    Endo/Heme/Allergies: Negative.    Psychiatric/Behavioral: Negative.  Negative for depression.   All other systems reviewed and are negative.         Objective:     /80   Wt 57.6 kg (127 lb)   LMP 2018   BMI 23.23 kg/m²      Physical Exam   Constitutional: She appears well-developed and well-nourished.   HENT:   Head: Normocephalic and atraumatic.   Eyes: Pupils are equal, round, and reactive to light.   Neck: Normal range of motion. Neck supple. No thyromegaly present.   Cardiovascular: Normal rate, regular rhythm and normal heart sounds.   Pulmonary/Chest: Effort normal and breath sounds normal. No respiratory distress.   Abdominal: Soft. Bowel sounds are normal. She exhibits no distension. There is no tenderness.   Genitourinary: Vagina normal and uterus normal. Pelvic exam was performed with patient supine. There is no rash or tenderness on the right labia. There is no rash or tenderness on the left labia. Uterus is not deviated, not enlarged and not tender. Cervix exhibits no motion tenderness. Right adnexum displays no mass and no tenderness. Left adnexum displays no mass and no tenderness. No erythema in the vagina. No foreign body in the vagina. No signs of injury around the vagina. No vaginal  discharge found.   Neurological: She is alert. She has normal reflexes.   Skin: Skin is warm and dry. No erythema.   Psychiatric: She has a normal mood and affect. Her behavior is normal. Thought content normal.   Vitals reviewed.            Assessment/Plan:     1. Postpartum care following vaginal delivery  - Start PAP at 19yo  - REFERRAL TO GYNECOLOGY for Mirena IUD, consent signed today  - RTC 2 wk for Mirena placement

## 2019-08-05 NOTE — PROGRESS NOTES
Pt here today for postpartum exam.  Delivery Date 6/24/19  Pt states no complaints  Currently: breast feeding  BCM: pt desires BC IUD, [facility].   Good ph:[phone]

## 2019-08-13 ENCOUNTER — GYNECOLOGY VISIT (OUTPATIENT)
Dept: OBGYN | Facility: CLINIC | Age: 19
End: 2019-08-13
Payer: MEDICAID

## 2019-08-13 VITALS — BODY MASS INDEX: 23.59 KG/M2 | DIASTOLIC BLOOD PRESSURE: 76 MMHG | WEIGHT: 129 LBS | SYSTOLIC BLOOD PRESSURE: 126 MMHG

## 2019-08-13 DIAGNOSIS — Z30.430 ENCOUNTER FOR INSERTION OF MIRENA IUD: ICD-10-CM

## 2019-08-13 LAB
INT CON NEG: NEGATIVE
INT CON POS: POSITIVE
POC URINE PREGNANCY TEST: NEGATIVE

## 2019-08-13 PROCEDURE — 58300 INSERT INTRAUTERINE DEVICE: CPT | Performed by: OBSTETRICS & GYNECOLOGY

## 2019-08-13 PROCEDURE — 81025 URINE PREGNANCY TEST: CPT | Performed by: OBSTETRICS & GYNECOLOGY

## 2019-08-13 NOTE — PROGRESS NOTES
GYN:  Patient here for planned IUD insertion.  See procedure note for detail.    Bridgette Garcia MD  [facility], Women's Health

## 2019-08-13 NOTE — PROCEDURES
IUD Insertion  Date/Time: 2019 2:18 PM  Performed by: Bridgette Garcia M.D.  Authorized by: Bridgette Garcia M.D.       CC: desires IUD insertion    HPI:  18 y.o.  presents today for desired IUD insertion.  Pt today desires mirena IUD.    Patient's last menstrual period was 2019 (exact date).  Current contraception: none, no sex since delivery.   Is BFing.  Starting first period now    UPT neg    /76   Wt 58.5 kg (129 lb)   LMP 2019 (Exact Date)   BMI 23.59 kg/m²   IUD Insertion:  The bimanual exam is performed the uterus is noted to be 8 weeks in size and is anteverted  A speculum was inserted into the vagina, the cervix was cleansed with Betadine swabs x3  Tenaculum was placed on the anterior lip of the cervix  The IUD was loaded into   The uterus was sounded to a depth of 8cm  The IUD was released into the uterus.    The strings trimmed to approximately 2 cm  Tenaculum was removed from the cervix and hemostasis was noted.  The patient tolerated the procedure well    Lot: [id number]      A/P: 18 y.o.  s/p mirena IUD insertion as above  - prior to insertion, risks of IUD reviewed with pt including risk of insertion including pain, bleeding, infection, uterine perforation (approximately 1.1000) and possible need for additional surgical procedure for removal, as well as risks of IUD including pregnancy/contraception failure,  expulsion.  Also discussed likely changes to menstrual cycle with mirena IUD including decreased, irregular or absent menses.   All questions answered, consent signed.    - reviewed need for backup contraception for intercourse within 7 days    Plan for f/u as needed for IUD check in 1-2mos, earlier if concerns.      Bridgette Garcia MD  [facility], Women's Health

## 2019-09-07 ENCOUNTER — HOSPITAL ENCOUNTER (EMERGENCY)
Facility: MEDICAL CENTER | Age: 19
End: 2019-09-07
Attending: EMERGENCY MEDICINE
Payer: MEDICAID

## 2019-09-07 ENCOUNTER — APPOINTMENT (OUTPATIENT)
Dept: RADIOLOGY | Facility: MEDICAL CENTER | Age: 19
End: 2019-09-07
Attending: EMERGENCY MEDICINE
Payer: MEDICAID

## 2019-09-07 VITALS
HEIGHT: 62 IN | OXYGEN SATURATION: 99 % | TEMPERATURE: 97.7 F | DIASTOLIC BLOOD PRESSURE: 81 MMHG | SYSTOLIC BLOOD PRESSURE: 112 MMHG | BODY MASS INDEX: 23.53 KG/M2 | HEART RATE: 98 BPM | WEIGHT: 127.87 LBS | RESPIRATION RATE: 18 BRPM

## 2019-09-07 DIAGNOSIS — S93.509A SPRAIN OF TOE, INITIAL ENCOUNTER: ICD-10-CM

## 2019-09-07 PROCEDURE — 73620 X-RAY EXAM OF FOOT: CPT | Mod: RT

## 2019-09-07 PROCEDURE — 99283 EMERGENCY DEPT VISIT LOW MDM: CPT

## 2019-09-08 NOTE — ED NOTES
"Presents complaining of recurring pain affecting her right second toe for approximately a year.   Chief Complaint   Patient presents with   • Toe Injury     /86   Pulse 99   Temp 36.5 °C (97.7 °F) (Temporal)   Resp 16   Ht 1.575 m (5' 2\")   Wt 58 kg (127 lb 13.9 oz)   LMP 08/11/2019 (Exact Date)   SpO2 96%   BMI 23.39 kg/m²     "

## 2019-09-08 NOTE — DISCHARGE INSTRUCTIONS
Ongoing pain and swelling of the toe without acute injury or apparent acute infection will likely need intervention by podiatrist or orthopedic surgeon.  X-rays suggest some joint destruction from previous infections, and n secondary  pain and swelling on a chronic basis. see a podiatrist or follow-up with our orthopedic surgeon on-call

## 2019-09-08 NOTE — ED PROVIDER NOTES
ED Provider Note    CHIEF COMPLAINT  Chief Complaint   Patient presents with   • Toe Injury       HPI  Joann Medley is a 19 y.o. female who presents with pain and swelling of her right second toe.  Within the last year she had a couple of episodes of toe infection 1 month for certain was a paronychia which was incised and drained.  Since then he has had can some continued swelling and and pain to the second toe.  This is not really changed today.    REVIEW OF SYSTEMS  See HPI for further details.    PAST MEDICAL HISTORY  Past Medical History:   Diagnosis Date   • Anemia in pregnancy 4/20/2019       FAMILY HISTORY  Family History   Problem Relation Age of Onset   • Cancer Paternal Grandmother         ?       SOCIAL HISTORY  Social History     Socioeconomic History   • Marital status: Single     Spouse name: Not on file   • Number of children: Not on file   • Years of education: Not on file   • Highest education level: Not on file   Occupational History   • Not on file   Social Needs   • Financial resource strain: Not on file   • Food insecurity:     Worry: Not on file     Inability: Not on file   • Transportation needs:     Medical: Not on file     Non-medical: Not on file   Tobacco Use   • Smoking status: Never Smoker   • Smokeless tobacco: Never Used   Substance and Sexual Activity   • Alcohol use: No     Alcohol/week: 0.0 oz   • Drug use: No   • Sexual activity: Yes     Partners: Male     Birth control/protection: Pill     Comment: Unplanned pregnanacy. Pt states was pregnant while on the pill   Lifestyle   • Physical activity:     Days per week: Not on file     Minutes per session: Not on file   • Stress: Not on file   Relationships   • Social connections:     Talks on phone: Not on file     Gets together: Not on file     Attends Sikhism service: Not on file     Active member of club or organization: Not on file     Attends meetings of clubs or organizations: Not on file     Relationship status:  "Not on file   • Intimate partner violence:     Fear of current or ex partner: Not on file     Emotionally abused: Not on file     Physically abused: Not on file     Forced sexual activity: Not on file   Other Topics Concern   • Behavioral problems No   • Interpersonal relationships No   • Sad or not enjoying activities No   • Suicidal thoughts No   • Poor school performance No   • Reading difficulties No   • Speech difficulties No   • Writing difficulties No   • Inadequate sleep No   • Excessive TV viewing No   • Excessive video game use No   • Inadequate exercise No   • Sports related No   • Poor diet Yes   • Family concerns for drug/alcohol abuse No   • Poor oral hygiene No   • Bike safety Yes   • Family concerns vehicle safety No   Social History Narrative   • Not on file       SURGICAL HISTORY  History reviewed. No pertinent surgical history.    CURRENT MEDICATIONS  Home Medications     Reviewed by Varun Vaca R.N. (Registered Nurse) on 09/07/19 at 1847  Med List Status: Partial   Medication Last Dose Status   acetaminophen (TYLENOL) 500 MG Tab PRN Active   ferrous sulfate 325 (65 Fe) MG tablet 9/7/2019 Active   ondansetron (ZOFRAN ODT) 4 MG TABLET DISPERSIBLE Not taking Active   Prenatal MV-Min-Fe Fum-FA-DHA (PRENATAL 1 PO) 9/7/2019 Active                ALLERGIES  No Known Allergies    PHYSICAL EXAM  VITAL SIGNS: /86   Pulse 99   Temp 36.5 °C (97.7 °F) (Temporal)   Resp 16   Ht 1.575 m (5' 2\")   Wt 58 kg (127 lb 13.9 oz)   LMP 08/11/2019 (Exact Date)   SpO2 96%   BMI 23.39 kg/m²    ng.  Skin: Warm, Dry, No erythema, No rash.   Back: No tenderness, No CVA tenderness.  Extremities: Intact distal pulses, No edema, No tenderness, No cyanosis, No clubbing.  There is redness and swelling of the toe particularly the distal phalanx but there does not appear to be an active paronychia it is not really very tender at this point  Musculoskeletal: Good range of motion in all major joints. No tenderness to " palpation or major deformities, or injuries noted.   Neurologic: Alert & oriented x 3, Normal motor function, Normal sensory function, No focal deficits noted.         RADIOLOGY/PROCEDURES  DX-FOOT-2- RIGHT   Final Result         New widening of the second DIP joint compared to 2018. The second toe is obscured on lateral view. This is indeterminant and dedicated multiple view radiographs of the second toe is recommended for further evaluation.      Patient    COURSE & MEDICAL DECISION MAKING  Pertinent Labs & Imaging studies reviewed. (See chart for details)  Action appears to have widening of the DIP joint and likely that had some scarring or destruction around the joint from previous infections.  Exam would not suggest an acute infection at this time.  I am going to refer her for follow-up she can either see a podiatrist will refer her to our orthopedic surgeon on-call.  If she has persistent discomfort she may need fusion or some other intervention  FINAL IMPRESSION  1.  Toe pain  2.   3.       Electronically signed by: Bear Lacey, 9/7/2019 8:07 PM

## 2019-09-08 NOTE — ED NOTES
Pt. To ED from home she denies any fever or drianage from her right 2 toe, pt. States that she began to have trouble with this toes aprox 1 year ago she has been placed on antibiotics and had the toe drained in the passed but noticed the swelling and tenderness was worse today she also states that her greater toe feels more swollen and tender today as swell pt. Toe is swollen and some discolaration is noted she is able to feel her toe and move it

## 2019-11-14 ENCOUNTER — HOSPITAL ENCOUNTER (EMERGENCY)
Facility: MEDICAL CENTER | Age: 19
End: 2019-11-14
Attending: EMERGENCY MEDICINE

## 2019-11-14 ENCOUNTER — APPOINTMENT (OUTPATIENT)
Dept: RADIOLOGY | Facility: MEDICAL CENTER | Age: 19
End: 2019-11-14
Attending: EMERGENCY MEDICINE

## 2019-11-14 VITALS
OXYGEN SATURATION: 92 % | RESPIRATION RATE: 27 BRPM | SYSTOLIC BLOOD PRESSURE: 131 MMHG | HEART RATE: 128 BPM | DIASTOLIC BLOOD PRESSURE: 76 MMHG | TEMPERATURE: 99.2 F | BODY MASS INDEX: 25.44 KG/M2 | HEIGHT: 62 IN | WEIGHT: 138.23 LBS

## 2019-11-14 DIAGNOSIS — H66.42 SUPPURATIVE OTITIS MEDIA OF LEFT EAR, UNSPECIFIED CHRONICITY: ICD-10-CM

## 2019-11-14 LAB
ALBUMIN SERPL BCP-MCNC: 4.2 G/DL (ref 3.2–4.9)
ALBUMIN/GLOB SERPL: 1.1 G/DL
ALP SERPL-CCNC: 62 U/L (ref 30–99)
ALT SERPL-CCNC: 11 U/L (ref 2–50)
ANION GAP SERPL CALC-SCNC: 14 MMOL/L (ref 0–11.9)
APPEARANCE UR: ABNORMAL
AST SERPL-CCNC: 16 U/L (ref 12–45)
BACTERIA #/AREA URNS HPF: ABNORMAL /HPF
BASOPHILS # BLD AUTO: 0.3 % (ref 0–1.8)
BASOPHILS # BLD: 0.02 K/UL (ref 0–0.12)
BILIRUB SERPL-MCNC: 0.4 MG/DL (ref 0.1–1.5)
BILIRUB UR QL STRIP.AUTO: ABNORMAL
BUN SERPL-MCNC: 14 MG/DL (ref 8–22)
CALCIUM SERPL-MCNC: 9.4 MG/DL (ref 8.4–10.2)
CHLORIDE SERPL-SCNC: 100 MMOL/L (ref 96–112)
CO2 SERPL-SCNC: 21 MMOL/L (ref 20–33)
COLOR UR: YELLOW
CREAT SERPL-MCNC: 0.89 MG/DL (ref 0.5–1.4)
EKG IMPRESSION: NORMAL
EOSINOPHIL # BLD AUTO: 0 K/UL (ref 0–0.51)
EOSINOPHIL NFR BLD: 0 % (ref 0–6.9)
EPI CELLS #/AREA URNS HPF: ABNORMAL /HPF
ERYTHROCYTE [DISTWIDTH] IN BLOOD BY AUTOMATED COUNT: 39.4 FL (ref 35.9–50)
GLOBULIN SER CALC-MCNC: 3.8 G/DL (ref 1.9–3.5)
GLUCOSE SERPL-MCNC: 138 MG/DL (ref 65–99)
GLUCOSE UR STRIP.AUTO-MCNC: NEGATIVE MG/DL
HCT VFR BLD AUTO: 36.6 % (ref 37–47)
HGB BLD-MCNC: 11.9 G/DL (ref 12–16)
IMM GRANULOCYTES # BLD AUTO: 0.02 K/UL (ref 0–0.11)
IMM GRANULOCYTES NFR BLD AUTO: 0.3 % (ref 0–0.9)
KETONES UR STRIP.AUTO-MCNC: 40 MG/DL
LACTATE BLD-SCNC: 1.1 MMOL/L (ref 0.5–2)
LEUKOCYTE ESTERASE UR QL STRIP.AUTO: NEGATIVE
LYMPHOCYTES # BLD AUTO: 0.66 K/UL (ref 1–4.8)
LYMPHOCYTES NFR BLD: 11.1 % (ref 22–41)
MCH RBC QN AUTO: 27 PG (ref 27–33)
MCHC RBC AUTO-ENTMCNC: 32.5 G/DL (ref 33.6–35)
MCV RBC AUTO: 83 FL (ref 81.4–97.8)
MICRO URNS: ABNORMAL
MONOCYTES # BLD AUTO: 0.34 K/UL (ref 0–0.85)
MONOCYTES NFR BLD AUTO: 5.7 % (ref 0–13.4)
MUCOUS THREADS #/AREA URNS HPF: ABNORMAL /HPF
NEUTROPHILS # BLD AUTO: 4.9 K/UL (ref 2–7.15)
NEUTROPHILS NFR BLD: 82.6 % (ref 44–72)
NITRITE UR QL STRIP.AUTO: NEGATIVE
NRBC # BLD AUTO: 0 K/UL
NRBC BLD-RTO: 0 /100 WBC
PH UR STRIP.AUTO: 6 [PH] (ref 5–8)
PLATELET # BLD AUTO: 201 K/UL (ref 164–446)
PMV BLD AUTO: 11 FL (ref 9–12.9)
POTASSIUM SERPL-SCNC: 3.3 MMOL/L (ref 3.6–5.5)
PROT SERPL-MCNC: 8 G/DL (ref 6–8.2)
PROT UR QL STRIP: 30 MG/DL
RBC # BLD AUTO: 4.41 M/UL (ref 4.2–5.4)
RBC # URNS HPF: ABNORMAL /HPF
RBC UR QL AUTO: NEGATIVE
SODIUM SERPL-SCNC: 135 MMOL/L (ref 135–145)
SP GR UR STRIP.AUTO: 1.02
WBC # BLD AUTO: 5.9 K/UL (ref 4.8–10.8)
WBC #/AREA URNS HPF: ABNORMAL /HPF

## 2019-11-14 PROCEDURE — 85025 COMPLETE CBC W/AUTO DIFF WBC: CPT

## 2019-11-14 PROCEDURE — 81001 URINALYSIS AUTO W/SCOPE: CPT

## 2019-11-14 PROCEDURE — 700102 HCHG RX REV CODE 250 W/ 637 OVERRIDE(OP): Performed by: EMERGENCY MEDICINE

## 2019-11-14 PROCEDURE — 87086 URINE CULTURE/COLONY COUNT: CPT

## 2019-11-14 PROCEDURE — 80053 COMPREHEN METABOLIC PANEL: CPT

## 2019-11-14 PROCEDURE — 71045 X-RAY EXAM CHEST 1 VIEW: CPT

## 2019-11-14 PROCEDURE — 87040 BLOOD CULTURE FOR BACTERIA: CPT

## 2019-11-14 PROCEDURE — 93005 ELECTROCARDIOGRAM TRACING: CPT | Performed by: EMERGENCY MEDICINE

## 2019-11-14 PROCEDURE — A9270 NON-COVERED ITEM OR SERVICE: HCPCS | Performed by: EMERGENCY MEDICINE

## 2019-11-14 PROCEDURE — 36415 COLL VENOUS BLD VENIPUNCTURE: CPT

## 2019-11-14 PROCEDURE — 83605 ASSAY OF LACTIC ACID: CPT

## 2019-11-14 PROCEDURE — 99284 EMERGENCY DEPT VISIT MOD MDM: CPT

## 2019-11-14 RX ORDER — AMOXICILLIN 500 MG/1
500 CAPSULE ORAL 3 TIMES DAILY
Qty: 30 CAP | Refills: 0 | Status: SHIPPED | OUTPATIENT
Start: 2019-11-14 | End: 2020-09-21

## 2019-11-14 RX ORDER — OXYCODONE HYDROCHLORIDE AND ACETAMINOPHEN 5; 325 MG/1; MG/1
1 TABLET ORAL ONCE
Status: COMPLETED | OUTPATIENT
Start: 2019-11-14 | End: 2019-11-14

## 2019-11-14 RX ORDER — IBUPROFEN 400 MG/1
400 TABLET ORAL EVERY 6 HOURS PRN
Status: SHIPPED | COMMUNITY
End: 2020-12-08

## 2019-11-14 RX ORDER — OXYCODONE HYDROCHLORIDE AND ACETAMINOPHEN 5; 325 MG/1; MG/1
2 TABLET ORAL EVERY 4 HOURS PRN
Status: DISCONTINUED | OUTPATIENT
Start: 2019-11-14 | End: 2019-11-14

## 2019-11-14 RX ORDER — ACETAMINOPHEN 325 MG/1
650 TABLET ORAL ONCE
Status: COMPLETED | OUTPATIENT
Start: 2019-11-14 | End: 2019-11-14

## 2019-11-14 RX ORDER — AMOXICILLIN 250 MG/1
1000 CAPSULE ORAL ONCE
Status: COMPLETED | OUTPATIENT
Start: 2019-11-14 | End: 2019-11-14

## 2019-11-14 RX ORDER — POTASSIUM CHLORIDE 20 MEQ/1
20 TABLET, EXTENDED RELEASE ORAL ONCE
Status: COMPLETED | OUTPATIENT
Start: 2019-11-14 | End: 2019-11-14

## 2019-11-14 RX ADMIN — OXYCODONE HYDROCHLORIDE AND ACETAMINOPHEN 1 TABLET: 5; 325 TABLET ORAL at 20:40

## 2019-11-14 RX ADMIN — ACETAMINOPHEN 650 MG: 325 TABLET, FILM COATED ORAL at 18:09

## 2019-11-14 RX ADMIN — POTASSIUM CHLORIDE 20 MEQ: 20 TABLET, EXTENDED RELEASE ORAL at 22:41

## 2019-11-14 RX ADMIN — AMOXICILLIN 1000 MG: 250 CAPSULE ORAL at 20:31

## 2019-11-14 ASSESSMENT — PAIN DESCRIPTION - DESCRIPTORS: DESCRIPTORS: ACHING;PRESSURE

## 2019-11-15 NOTE — ED PROVIDER NOTES
"ED Provider Note    CHIEF COMPLAINT  Chief Complaint   Patient presents with   • Ear Pain     \"feels closed\" has felt that way for a couple days and hurts a lot.   • Sore Throat     with some nasal drainage       HPI  Joann Medley is a 19 y.o. female who presents with a chief complaint of ear pain left ear duration 2 days feels like it is closed throbbing pain lightly better with Motrin.  Nonradiating no alleviating factors for the slight with Motrin not worse when she coughs or sneezes a slight decrease hearing.    She been sick for a few days she had a fever chills she states she had a sore throat as well with runny nose congestion.    The patient triggered the sepsis protocol.    REVIEW OF SYSTEMS  General: No fever or chills.  Chills no fever ye discharge. No eye pain.  Ear nose throat: No sore throat or  trouble swallowing.  Pulmonary: No shortness of breath or cough.  Cardiovascular: No chest pain or chest pressure.  GI: No abdominal pain nausea or vomiting.  : No dysuria or hematuria  Dermatologic: No rashes. No abrasions.  Neurologic: Neurolysed weakness.    All other systems are negative      PAST MEDICAL HISTORY  Past Medical History:   Diagnosis Date   • Anemia in pregnancy 4/20/2019       FAMILY HISTORY  Family History   Problem Relation Age of Onset   • Cancer Paternal Grandmother         ?       SOCIAL HISTORY  Social History     Socioeconomic History   • Marital status: Single     Spouse name: Not on file   • Number of children: Not on file   • Years of education: Not on file   • Highest education level: Not on file   Occupational History   • Not on file   Social Needs   • Financial resource strain: Not on file   • Food insecurity:     Worry: Not on file     Inability: Not on file   • Transportation needs:     Medical: Not on file     Non-medical: Not on file   Tobacco Use   • Smoking status: Never Smoker   • Smokeless tobacco: Never Used   Substance and Sexual Activity   • Alcohol " use: No     Alcohol/week: 0.0 oz   • Drug use: No   • Sexual activity: Yes     Partners: Male     Birth control/protection: Pill     Comment: Unplanned pregnanacy. Pt states was pregnant while on the pill   Lifestyle   • Physical activity:     Days per week: Not on file     Minutes per session: Not on file   • Stress: Not on file   Relationships   • Social connections:     Talks on phone: Not on file     Gets together: Not on file     Attends Bahai service: Not on file     Active member of club or organization: Not on file     Attends meetings of clubs or organizations: Not on file     Relationship status: Not on file   • Intimate partner violence:     Fear of current or ex partner: Not on file     Emotionally abused: Not on file     Physically abused: Not on file     Forced sexual activity: Not on file   Other Topics Concern   • Behavioral problems No   • Interpersonal relationships No   • Sad or not enjoying activities No   • Suicidal thoughts No   • Poor school performance No   • Reading difficulties No   • Speech difficulties No   • Writing difficulties No   • Inadequate sleep No   • Excessive TV viewing No   • Excessive video game use No   • Inadequate exercise No   • Sports related No   • Poor diet Yes   • Family concerns for drug/alcohol abuse No   • Poor oral hygiene No   • Bike safety Yes   • Family concerns vehicle safety No   Social History Narrative   • Not on file       SURGICAL HISTORY  History reviewed. No pertinent surgical history.    CURRENT MEDICATIONS  Home Medications     Reviewed by Amparo Murphy R.N. (Registered Nurse) on 11/14/19 at 1755  Med List Status: Partial   Medication Last Dose Status   acetaminophen (TYLENOL) 500 MG Tab  Active   ibuprofen (MOTRIN) 400 MG Tab 11/14/2019 Active   Prenatal MV-Min-Fe Fum-FA-DHA (PRENATAL 1 PO) 11/14/2019 Active                ALLERGIES  No Known Allergies    PHYSICAL EXAM  VITAL SIGNS: /74   Pulse (!) 137   Temp 37.3 °C (99.2 °F)  "(Temporal)   Resp 18   Ht 1.575 m (5' 2\")   Wt 62.7 kg (138 lb 3.7 oz)   SpO2 97%   BMI 25.28 kg/m²    Constitutional: Well developed, Well nourished, no acute distress, panic murmur in the left shows effusion erythema bulging.  The right shows minimal effusion no erythema.  Oropharynx shows minimal erythema no exudate no uvular shift no peritonsillar swelling  HENT: Normocephalic, Atraumatic, Oropharynx moist, No oral exudates, Nose normal.   Eyes: PERRLA, EOMI, Conjunctiva normal, No discharge.   Musculoskeletal: Neck normal range of motion, No tenderness, Supple,  Cardiovascular: Irregular rhythm rate of 80 no murmurs.  Thorax & Lungs: Clear to auscultate bilaterally  Abdomen: Bowel sounds normal, Soft, No tenderness, No masses, No pulsatile masses.   Skin: Warm, Dry, No erythema, No rash.   : No CVA tenderness.   Neurologic: Alert & oriented , moves all extremities equally  Psychiatric:  Calm, not anxious        RADIOLOGY/PROCEDURES  Results for orders placed or performed during the hospital encounter of 11/14/19   CBC WITH DIFFERENTIAL   Result Value Ref Range    WBC 5.9 4.8 - 10.8 K/uL    RBC 4.41 4.20 - 5.40 M/uL    Hemoglobin 11.9 (L) 12.0 - 16.0 g/dL    Hematocrit 36.6 (L) 37.0 - 47.0 %    MCV 83.0 81.4 - 97.8 fL    MCH 27.0 27.0 - 33.0 pg    MCHC 32.5 (L) 33.6 - 35.0 g/dL    RDW 39.4 35.9 - 50.0 fL    Platelet Count 201 164 - 446 K/uL    MPV 11.0 9.0 - 12.9 fL    Neutrophils-Polys 82.60 (H) 44.00 - 72.00 %    Lymphocytes 11.10 (L) 22.00 - 41.00 %    Monocytes 5.70 0.00 - 13.40 %    Eosinophils 0.00 0.00 - 6.90 %    Basophils 0.30 0.00 - 1.80 %    Immature Granulocytes 0.30 0.00 - 0.90 %    Nucleated RBC 0.00 /100 WBC    Neutrophils (Absolute) 4.90 2.00 - 7.15 K/uL    Lymphs (Absolute) 0.66 (L) 1.00 - 4.80 K/uL    Monos (Absolute) 0.34 0.00 - 0.85 K/uL    Eos (Absolute) 0.00 0.00 - 0.51 K/uL    Baso (Absolute) 0.02 0.00 - 0.12 K/uL    Immature Granulocytes (abs) 0.02 0.00 - 0.11 K/uL    NRBC " (Absolute) 0.00 K/uL   COMP METABOLIC PANEL   Result Value Ref Range    Sodium 135 135 - 145 mmol/L    Potassium 3.3 (L) 3.6 - 5.5 mmol/L    Chloride 100 96 - 112 mmol/L    Co2 21 20 - 33 mmol/L    Anion Gap 14.0 (H) 0.0 - 11.9    Glucose 138 (H) 65 - 99 mg/dL    Bun 14 8 - 22 mg/dL    Creatinine 0.89 0.50 - 1.40 mg/dL    Calcium 9.4 8.4 - 10.2 mg/dL    AST(SGOT) 16 12 - 45 U/L    ALT(SGPT) 11 2 - 50 U/L    Alkaline Phosphatase 62 30 - 99 U/L    Total Bilirubin 0.4 0.1 - 1.5 mg/dL    Albumin 4.2 3.2 - 4.9 g/dL    Total Protein 8.0 6.0 - 8.2 g/dL    Globulin 3.8 (H) 1.9 - 3.5 g/dL    A-G Ratio 1.1 g/dL   URINALYSIS   Result Value Ref Range    Color Yellow     Character Hazy (A)     Specific Gravity 1.025 <1.035    Ph 6.0 5.0 - 8.0    Glucose Negative Negative mg/dL    Ketones 40 (A) Negative mg/dL    Protein 30 (A) Negative mg/dL    Bilirubin Small (A) Negative    Nitrite Negative Negative    Leukocyte Esterase Negative Negative    Occult Blood Negative Negative    Micro Urine Req Microscopic    LACTIC ACID   Result Value Ref Range    Lactic Acid 1.1 0.5 - 2.0 mmol/L   URINE MICROSCOPIC (W/UA)   Result Value Ref Range    WBC 5-10 (A) /hpf    RBC 0-2 /hpf    Bacteria Few (A) None /hpf    Epithelial Cells Many (A) Few /hpf    Mucous Threads Moderate /hpf   ESTIMATED GFR   Result Value Ref Range    GFR If African American >60 >60 mL/min/1.73 m 2    GFR If Non African American >60 >60 mL/min/1.73 m 2   EKG   Result Value Ref Range    Report       [facility] Emergency Dept.    Test Date:  2019  Pt Name:    DESTINY VELÁSQUEZ            Department: [facility]  MRN:        [id number]                      [facility]  Gender:     Female                       Technician:   :        2000                   Requested By:ANURADHA MENDIOLA  Order #:    [id number]                    Theodore MD:    Measurements  Intervals                                Axis  Rate:       148                           P:          18  WI:         112                          QRS:        28  QRSD:       80                           T:          52  QT:         323  QTc:        508    Interpretive Statements  Sinus tachycardia  Ventricular bigeminy  Borderline T wave abnormalities  Compared to ECG 07/30/2019 08:35:25  T-wave abnormality now present          COURSE & MEDICAL DECISION MAKING  Pertinent Labs & Imaging studies reviewed. (See chart for details)  Regular rhythm she initially 137 heart rate now she is 88 when doing EKG she received amoxicillin Tylenol fever is gone down heart rates gone down at this point the patient looks otherwise well I do not think she has sepsis.    She has been observed here she still has tachycardia now sinus.  Was talking and went down to 122 patient is no pain no fever.  There is some mild hypokalemia go ahead and replace it.    CBC metabolic panel is otherwise grossly normal.  I reviewed old EKG patient has had tachycardia with bigeminy at that time as well    Had a long discussion states this happens all the time says her doctor told is nothing to worry about.  At this point I am not finding any serious signs of infection except for slight ear infection to the has no signs of meningitis and observed here for 4 hours she is not gotten worse.  The patient was safely discharged home on amoxicillin I like to come back tomorrow just to recheck her vital signs.  Patient verbalized understanding she will return.    FINAL IMPRESSION  1.  Otitis media  2.  Tachycardia  3.      Electronically signed by: Saleem Lopez, 11/14/2019 8:24 PM

## 2019-11-15 NOTE — ED TRIAGE NOTES
"Chief Complaint   Patient presents with   • Ear Pain     \"feels closed\" has felt that way for a couple days and hurts a lot.   • Sore Throat     with some nasal drainage     /73   Pulse (!) 140   Temp (!) 38.9 °C (102.1 °F) (Temporal)   Resp 16   Ht 1.575 m (5' 2\")   Wt 62.7 kg (138 lb 3.7 oz)   SpO2 97%   BMI 25.28 kg/m²     Pt informed of wait times. Educated on triage process.  Asked to return to triage RN for any new or worsening of symptoms. Thanked for patience.    "

## 2019-11-17 LAB
BACTERIA UR CULT: NORMAL
SIGNIFICANT IND 70042: NORMAL
SITE SITE: NORMAL
SOURCE SOURCE: NORMAL

## 2019-11-19 LAB
BACTERIA BLD CULT: NORMAL
SIGNIFICANT IND 70042: NORMAL
SITE SITE: NORMAL
SOURCE SOURCE: NORMAL

## 2020-05-29 ENCOUNTER — HOSPITAL ENCOUNTER (OUTPATIENT)
Dept: RADIOLOGY | Facility: MEDICAL CENTER | Age: 20
End: 2020-05-29
Attending: FAMILY MEDICINE
Payer: MEDICAID

## 2020-05-29 ENCOUNTER — OFFICE VISIT (OUTPATIENT)
Dept: MEDICAL GROUP | Facility: MEDICAL CENTER | Age: 20
End: 2020-05-29
Attending: FAMILY MEDICINE
Payer: MEDICAID

## 2020-05-29 VITALS
RESPIRATION RATE: 14 BRPM | DIASTOLIC BLOOD PRESSURE: 65 MMHG | HEART RATE: 102 BPM | SYSTOLIC BLOOD PRESSURE: 109 MMHG | OXYGEN SATURATION: 97 % | BODY MASS INDEX: 27.25 KG/M2 | TEMPERATURE: 98.6 F | WEIGHT: 149 LBS

## 2020-05-29 DIAGNOSIS — M54.50 CHRONIC LOW BACK PAIN, UNSPECIFIED BACK PAIN LATERALITY, UNSPECIFIED WHETHER SCIATICA PRESENT: ICD-10-CM

## 2020-05-29 DIAGNOSIS — G89.29 CHRONIC LOW BACK PAIN, UNSPECIFIED BACK PAIN LATERALITY, UNSPECIFIED WHETHER SCIATICA PRESENT: ICD-10-CM

## 2020-05-29 PROCEDURE — 99214 OFFICE O/P EST MOD 30 MIN: CPT | Performed by: FAMILY MEDICINE

## 2020-05-29 PROCEDURE — 99213 OFFICE O/P EST LOW 20 MIN: CPT | Performed by: FAMILY MEDICINE

## 2020-05-29 PROCEDURE — 72100 X-RAY EXAM L-S SPINE 2/3 VWS: CPT

## 2020-05-29 ASSESSMENT — FIBROSIS 4 INDEX: FIB4 SCORE: 0.48

## 2020-05-29 NOTE — PROGRESS NOTES
Chief Complaint:   Chief Complaint   Patient presents with   • Annual Exam       HPI:Established patient   Joann Medley is a 19 y.o. female who presents for evaluation of back pain      Chronic low back pain  Patient reports that she has been experiencing this low back pain for almost 1 year now, after delivering her 1-year-old daughter.  Patient reports that the pain is a dull ache pain that is usually 6/10 consistently there all the time.  Sometimes it jumps to as high as 9/10.  The pain does not radiate to her lower extremities.  No history of incident or trauma or accident related to the onset of pain.  Patient is relating her pain to possibly the IUD that she has inserted 1 year ago.  And would like to explore other options of contraception if there is no other justification for her back pain.  Denies urinary symptoms or abnormal vaginal bleeding.  Not sexually active at this time.        Past medical history, family history, social history and medications reviewed and updated in the record. Today   Current medications, problem list and allergies reviewed in [facility] today   Health maintenance topics are reviewed and updated.    There are no active problems to display for this patient.    Family History   Problem Relation Age of Onset   • Cancer Paternal Grandmother         ?     Social History     Socioeconomic History   • Marital status: Single     Spouse name: Not on file   • Number of children: Not on file   • Years of education: Not on file   • Highest education level: Not on file   Occupational History   • Not on file   Social Needs   • Financial resource strain: Not on file   • Food insecurity     Worry: Not on file     Inability: Not on file   • Transportation needs     Medical: Not on file     Non-medical: Not on file   Tobacco Use   • Smoking status: Never Smoker   • Smokeless tobacco: Never Used   Substance and Sexual Activity   • Alcohol use: No     Alcohol/week: 0.0 oz   • Drug use: No    • Sexual activity: Yes     Partners: Male     Birth control/protection: Pill     Comment: Unplanned pregnanacy. Pt states was pregnant while on the pill   Lifestyle   • Physical activity     Days per week: Not on file     Minutes per session: Not on file   • Stress: Not on file   Relationships   • Social connections     Talks on phone: Not on file     Gets together: Not on file     Attends Christian service: Not on file     Active member of club or organization: Not on file     Attends meetings of clubs or organizations: Not on file     Relationship status: Not on file   • Intimate partner violence     Fear of current or ex partner: Not on file     Emotionally abused: Not on file     Physically abused: Not on file     Forced sexual activity: Not on file   Other Topics Concern   • Behavioral problems No   • Interpersonal relationships No   • Sad or not enjoying activities No   • Suicidal thoughts No   • Poor school performance No   • Reading difficulties No   • Speech difficulties No   • Writing difficulties No   • Inadequate sleep No   • Excessive TV viewing No   • Excessive video game use No   • Inadequate exercise No   • Sports related No   • Poor diet Yes   • Family concerns for drug/alcohol abuse No   • Poor oral hygiene No   • Bike safety Yes   • Family concerns vehicle safety No   Social History Narrative   • Not on file           Review Of Systems  As documented in HPI above  PHYSICAL EXAMINATION:    /65 (BP Location: Left arm, Patient Position: Sitting, BP Cuff Size: Adult)   Pulse (!) 102   Temp 37 °C (98.6 °F) (Temporal)   Resp 14   Wt 67.6 kg (149 lb)   SpO2 97%   BMI 27.25 kg/m²   Gen.: Well-developed, well-nourished, no apparent distress, pleasant and cooperative with the examination  HEENT: Normocephalic/atraumatic,   Neck: No JVD or bruits, no adenopathy  Cor: Regular rate and rhythm without murmur gallop or rub  Lungs: Clear to auscultation with equal breath sounds bilaterally. No  wheezes, rhonchi.  Abdomen: Soft nontender without hepatosplenomegaly or masses appreciated, normoactive bowel sounds  Extremities: No cyanosis, clubbing or edema          ASSESSMENT/Plan:  1. Chronic low back pain, unspecified back pain laterality, unspecified whether sciatica present   new problem to be addressed by me today.    Chronic pain, uncontrolled, without red flags.  Declines analgesia at this time.  Advised to do an x-ray for further evaluation follow-up with physical therapy for further management.  Likely related to the IUD but I discussed with the patient if after physical therapy and work-up the pain is still not resolved and she does not feel comfortable on the IUD we can explore other options like contraceptive pills and remove her IUD.  She voices understanding.    URINALYSIS,CULTURE IF INDICATED    REFERRAL TO PHYSICAL THERAPY Reason for Therapy: Eval/Treat/Report    DX-LUMBAR SPINE-2 OR 3 VIEWS       Please note that this dictation was created using voice recognition software. I have made every reasonable attempt to correct obvious errors but there may be errors of grammar and content that I may have overlooked prior to finalization of this note.

## 2020-06-08 ENCOUNTER — HOSPITAL ENCOUNTER (OUTPATIENT)
Dept: LAB | Facility: MEDICAL CENTER | Age: 20
End: 2020-06-08
Attending: FAMILY MEDICINE
Payer: MEDICAID

## 2020-06-08 ENCOUNTER — PHYSICAL THERAPY (OUTPATIENT)
Dept: PHYSICAL THERAPY | Facility: REHABILITATION | Age: 20
End: 2020-06-08
Attending: FAMILY MEDICINE
Payer: MEDICAID

## 2020-06-08 DIAGNOSIS — M54.50 CHRONIC LOW BACK PAIN, UNSPECIFIED BACK PAIN LATERALITY, UNSPECIFIED WHETHER SCIATICA PRESENT: ICD-10-CM

## 2020-06-08 DIAGNOSIS — G89.29 CHRONIC LOW BACK PAIN WITHOUT SCIATICA, UNSPECIFIED BACK PAIN LATERALITY: ICD-10-CM

## 2020-06-08 DIAGNOSIS — G89.29 CHRONIC LOW BACK PAIN, UNSPECIFIED BACK PAIN LATERALITY, UNSPECIFIED WHETHER SCIATICA PRESENT: ICD-10-CM

## 2020-06-08 DIAGNOSIS — M54.50 CHRONIC LOW BACK PAIN WITHOUT SCIATICA, UNSPECIFIED BACK PAIN LATERALITY: ICD-10-CM

## 2020-06-08 LAB
APPEARANCE UR: ABNORMAL
BACTERIA #/AREA URNS HPF: ABNORMAL /HPF
BILIRUB UR QL STRIP.AUTO: NEGATIVE
COLOR UR: YELLOW
EPI CELLS #/AREA URNS HPF: ABNORMAL /HPF
GLUCOSE UR STRIP.AUTO-MCNC: NEGATIVE MG/DL
HYALINE CASTS #/AREA URNS LPF: ABNORMAL /LPF
KETONES UR STRIP.AUTO-MCNC: ABNORMAL MG/DL
LEUKOCYTE ESTERASE UR QL STRIP.AUTO: ABNORMAL
MICRO URNS: ABNORMAL
NITRITE UR QL STRIP.AUTO: NEGATIVE
PH UR STRIP.AUTO: 6 [PH] (ref 5–8)
PROT UR QL STRIP: NEGATIVE MG/DL
RBC # URNS HPF: ABNORMAL /HPF
RBC UR QL AUTO: NEGATIVE
SP GR UR STRIP.AUTO: 1.03
UROBILINOGEN UR STRIP.AUTO-MCNC: 0.2 MG/DL
WBC #/AREA URNS HPF: ABNORMAL /HPF

## 2020-06-08 PROCEDURE — 81001 URINALYSIS AUTO W/SCOPE: CPT

## 2020-06-08 PROCEDURE — 97162 PT EVAL MOD COMPLEX 30 MIN: CPT

## 2020-06-08 PROCEDURE — 87086 URINE CULTURE/COLONY COUNT: CPT

## 2020-06-08 ASSESSMENT — ENCOUNTER SYMPTOMS
ALLEVIATING FACTORS: MASSAGE
ALLEVIATING FACTORS: STRETCHING
QUALITY: DULL ACHE
PAIN SCALE: 7
EXACERBATED BY: BENDING
EXACERBATED BY: PROLONGED STANDING
EXACERBATED BY: LIFTING
PAIN TIMING: CONSTANT
EXACERBATED BY: WALKING

## 2020-06-08 NOTE — OP THERAPY EVALUATION
"  Outpatient Physical Therapy  INITIAL EVALUATION    [facility] Physical Therapy [facility]  [address]  [address]  [place] [address]  Phone:  [phone]  Fax:  [phone]    Date of Evaluation: 2020    Patient: Joann Medley  YOB: 2000  MRN: [id number]     Referring Provider: Ellie Muhammad M.D.  [address]  [address]  [place] [address]   Referring Diagnosis Low back pain [M54.5];Other chronic pain [G89.29]     Time Calculation    Start time: 745  Stop time: 0839 Time Calculation (min): 54 minutes         Chief Complaint: Back Problem    Visit Diagnoses     ICD-10-CM   1. Chronic low back pain without sciatica, unspecified back pain laterality  M54.5    G89.29         Subjective:   History of Present Illness:     Date of onset:  2018    Mechanism of injury:  Pt reports chronic Low back pain, all the way across waistline.  Started 2-3 years ago, but worsened over the past few months or year.  Had a baby almost a year ago with epidural, and had an IUD placed since then.  Pt thinks maybe one or both of those have something to do with back pain.  Pt had been going to gym, doing cardio and weights, until the gyms closed about 2 months ago due to COVID19 restrictions.  Pt reports her back pain was worse when she was going to the gym, states maybe she wasn't \"working out right\".  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  Requires being on her feet most of the day.  Standing about 15 min causes back pain.  States she feels better when moving than standing in one place.  Walking gets painful after a longer period of time.  Walking with shopping cart or stroller is less painful.  Walks with her family often.  Unable to carry baby in baby carrier on chest due to back pain.      Sleep disturbance:  Non-restful sleep (reports she tosses and turns alot)  Pain:     Current pain ratin    Location:  Low back pain, bilaterally    Quality:  Dull ache    Pain " timing:  Constant    Relieving factors:  Massage and stretching (forward flexion stretch feels good, topical creams don't do much, OTC meds don't help much either.)    Aggravating factors:  Bending, lifting, prolonged standing and walking  Social Support:     Lives with:  Young children  Diagnostic Tests:     X-ray: abnormal      Diagnostic Tests Comments:  Vertebral body height is well maintained.  There is no evidence of fracture.   There is mild retrolisthesis at the L5-S1 level.   There is no evidence of degenerative disk disease.   Treatments:     None    Patient Goals:     Patient goals for therapy:  Increased strength      Past Medical History:   Diagnosis Date   • Anemia in pregnancy 4/20/2019     No past surgical history on file.  Social History     Tobacco Use   • Smoking status: Never Smoker   • Smokeless tobacco: Never Used   Substance Use Topics   • Alcohol use: No     Alcohol/week: 0.0 oz     Family and Occupational History     Socioeconomic History   • Marital status: Single     Spouse name: Not on file   • Number of children: Not on file   • Years of education: Not on file   • Highest education level: Not on file   Occupational History   • Not on file       Objective     Postural Observations  Seated posture: poor        Hip Screen   Hip range of motion within functional limits.    Tenderness     Left Hip   No tenderness in the ASIS, PSIS and greater trochanter.     Right Hip   No tenderness in the ASIS, PSIS and greater trochanter.     Active Range of Motion     Lumbar   Flexion: within functional limits  Extension: within functional limits (pain)  Left lateral flexion: within functional limits  Right lateral flexion: within functional limits  Left rotation: within functional limits  Right rotation: within functional limits    Joint Play   Spine     Central PA [place]        L1: WFL       L2: WFL       L3: WFL       L4: WFL       L5: painful and WFL       S1: painful and WFL    Unilateral PA Glide  "(left)        L3: WFL       L4: WFL       L5: painful and WFL       S1: painful and WFL    Unilateral PA Glide (right)        L3: WFL       L4: WFL       L5: WFL       S1: WFL        Strength:      Lower extremities   Normal left lower extremity strength  Normal right lower extremity strength    Tests       Lumbar spine (left)      Negative slump.   Lumbar spine (right)     Negative slump.     Left Pelvic Girdle/Sacrum   Negative: thigh thrust.     Right Pelvic Girdle/Sacrum   Positive: sacral thrust.     Left Hip   Positive Gaenslen's.   Negative SI compression and SI distraction.   Bear: Negative.   SLR: Negative.     Right Hip   Positive Gaenslen's.   Negative SI compression and SI distraction.   Bear: Negative.    SLR: Negative.     Additional Tests Details  Mild pull reported in lumbar area with B Gaenslen's and R sacral thrust.    Sunil LumbarTest   Static tests   Lying prone on elbows: slightly sore in low back  Sitting slouched: \"comfortable\"  Sitting erect: increased pain        Therapeutic Exercises (CPT 25750):     1. Hooklying TA bracing    2. Hooklying TA march    3. Hooklying TA bent knee fall out with band    4. Bridges    5. Bear stretch    6. Active sciatic nerve glide HS stretch at 90/90    7. Quadruped fire hydrant    8. Quadruped alt hip ext    9. Squat to stand hip hinge at wall      Therapeutic Exercise Summary: Pt performed these exercises with instruction and SPV.  Provided handout with these exercises for daily HEP.      Time-based treatments/modalities:           Assessment, Response and Plan:   Impairments: lacks appropriate home exercise program and pain with function    Assessment details:  19 y.o. female presents with chronic bilateral low back pain.  Pt demonstrates mild instability in low lumbar spine and postural dysfunction.  Pt will benefit from skilled PT services to improve core strength, improve endurance and biomechanics for functional activity.  Prognosis: good  "   Goals:   Short Term Goals:   1. Pt able to shriley standing 30 min without increased back pain.  2. Pt reports less pain with lifting/carrying child.  3. Pt will be independent with daily HEP.  Short term goal time span:  4-6 weeks      Long Term Goals:    1. Pt reports less difficulty with sleep due to back pain.  2. Pt able to return to workout/ physical activity without increased back pain.  3. Pt will report increased function via improved scores on Melecio Jerel Low Back Pain and Disability Questionnaire.  Long term goal time span:  2-4 months    Plan:   Therapy options:  Physical therapy treatment to continue  Planned therapy interventions:  E Stim Unattended (CPT 80533), Therapeutic Activities (CPT 02352) and Therapeutic Exercise (CPT 65614)  Frequency:  2x week  Duration in visits:  12  Discussed with:  Patient      Functional Assessment Used  Melecio Jerel Low Back Pain and Disability Score: 25     Referring provider co-signature:  I have reviewed this plan of care and my co-signature certifies the need for services.    Physician Signature: ________________________________ Date: ______________

## 2020-06-11 LAB
BACTERIA UR CULT: NORMAL
SIGNIFICANT IND 70042: NORMAL
SITE SITE: NORMAL
SOURCE SOURCE: NORMAL

## 2020-06-29 ENCOUNTER — PHYSICAL THERAPY (OUTPATIENT)
Dept: PHYSICAL THERAPY | Facility: REHABILITATION | Age: 20
End: 2020-06-29
Attending: FAMILY MEDICINE
Payer: MEDICAID

## 2020-06-29 DIAGNOSIS — M54.50 CHRONIC LOW BACK PAIN WITHOUT SCIATICA, UNSPECIFIED BACK PAIN LATERALITY: ICD-10-CM

## 2020-06-29 DIAGNOSIS — G89.29 CHRONIC LOW BACK PAIN WITHOUT SCIATICA, UNSPECIFIED BACK PAIN LATERALITY: ICD-10-CM

## 2020-06-29 PROCEDURE — 97110 THERAPEUTIC EXERCISES: CPT

## 2020-06-29 PROCEDURE — 97014 ELECTRIC STIMULATION THERAPY: CPT

## 2020-06-29 NOTE — OP THERAPY DAILY TREATMENT
"  Outpatient Physical Therapy  DAILY TREATMENT     [facility] Physical Therapy [address]  [address]  [place] [address]  Phone:  [phone]  Fax:  [phone]    Date: 06/29/2020    Patient: Joann Medley  YOB: 2000  MRN: [id number]     Time Calculation    Start time: 0745  Stop time: 0827 Time Calculation (min): 42 minutes         Chief Complaint: Back Problem    Visit #: 2    SUBJECTIVE:  The exercises have helped.  Somedays, it still hurts really bad.    OBJECTIVE:        Therapeutic Exercises (CPT 45726):     1. Prone knee flexion, x10B    2. Prone hip ext, x10B, causes \"pressure\" in lumbar    3. Ball squats, x10    4. Cat cow, x7, extension causes lumbar pain across waistline    5. Birddog, 3\" x5 alt R/L    6. Prone hip rotation, x10B    7. Seated ball rows and B shoulder ext, med theratubing, x15 all    8. Ball bridges, x15    9. TA DKC with ball, x15    10. LTR with ball, x10      Therapeutic Exercise Summary: Lumbar pain following therex= 5/10.    Therapeutic Treatments and Modalities:     1. E Stim Unattended (CPT 01935), IFC and MH to low back in prone, x15 min    Time-based treatments/modalities:    Physical Therapy Timed Treatment Charges  Therapeutic exercise minutes (CPT 33220): 27 minutes  ASSESSMENT:   Response to treatment:   Pt able to shirley lumbar stab therex.    PLAN/RECOMMENDATIONS:   Plan for treatment: therapy treatment to continue next visit.  Planned interventions for next visit: continue with current treatment.       "

## 2020-06-30 ENCOUNTER — TELEPHONE (OUTPATIENT)
Dept: MEDICAL GROUP | Facility: MEDICAL CENTER | Age: 20
End: 2020-06-30

## 2020-06-30 NOTE — TELEPHONE ENCOUNTER
Left message with patient about no show to appointment today 6/30/20.  Explained this was her first no show and the no show policy.

## 2020-07-06 ENCOUNTER — PHYSICAL THERAPY (OUTPATIENT)
Dept: PHYSICAL THERAPY | Facility: REHABILITATION | Age: 20
End: 2020-07-06
Attending: FAMILY MEDICINE
Payer: MEDICAID

## 2020-07-06 DIAGNOSIS — G89.29 CHRONIC LOW BACK PAIN WITHOUT SCIATICA, UNSPECIFIED BACK PAIN LATERALITY: ICD-10-CM

## 2020-07-06 DIAGNOSIS — M54.50 CHRONIC LOW BACK PAIN WITHOUT SCIATICA, UNSPECIFIED BACK PAIN LATERALITY: ICD-10-CM

## 2020-07-06 PROCEDURE — 97110 THERAPEUTIC EXERCISES: CPT

## 2020-07-06 PROCEDURE — 97032 APPL MODALITY 1+ESTIM EA 15: CPT

## 2020-07-06 NOTE — OP THERAPY DAILY TREATMENT
"  Outpatient Physical Therapy  DAILY TREATMENT     [facility] Physical Therapy [address]  [address]  [place] [address]  Phone:  [phone]  Fax:  [phone]    Date: 07/06/2020    Patient: Joann Medley  YOB: 2000  MRN: [id number]     Time Calculation    Start time: 0815  Stop time: 0915 Time Calculation (min): 60 minutes         Chief Complaint: Back Pain    Visit #: 3    SUBJECTIVE:  Pt reports sxs are improving and not as constant. States feeling better after exercises now and estim helped reduce sxs.    OBJECTIVE:        Therapeutic Exercises (CPT 75333):     1. Wall ball squats, x20    2. Supine ext over ball, x2 min    3. Ski jump, 3x30 sec    4. Foam Roller, pec stretch, alt UE OH and lat, marching    5. Birddog, 3\" x5 alt R/L    6. Multifidi sidesteps w/pink TB, x10 ea    Therapeutic Treatments and Modalities:     1. E Stim Unattended (CPT 31531), IFC and MH to low back in prone, x15 min    Time-based treatments/modalities:    Physical Therapy Timed Treatment Charges  E-stim attended minutes (CPT 28882): 15 minutes  Therapeutic exercise minutes (CPT 70850): 40 minutes    ASSESSMENT:   Tolerated all ther ex w/o increased c/o pain, needs VCs to keep upright posture w/scapula engaged.    PLAN/RECOMMENDATIONS:   Plan for treatment: therapy treatment to continue next visit.  Planned interventions for next visit: continue with current treatment.       "

## 2020-07-08 ENCOUNTER — APPOINTMENT (OUTPATIENT)
Dept: PHYSICAL THERAPY | Facility: REHABILITATION | Age: 20
End: 2020-07-08
Attending: FAMILY MEDICINE
Payer: MEDICAID

## 2020-07-08 NOTE — OP THERAPY DAILY TREATMENT
"  Outpatient Physical Therapy  DAILY TREATMENT     [facility] Physical Therapy [address]  [address]  [place] [address]  Phone:  [phone]  Fax:  [phone]    Date: 07/08/2020    Patient: Joann Medley  YOB: 2000  MRN: [id number]     Time Calculation                   Chief Complaint: No chief complaint on file.    Visit #: 4    SUBJECTIVE:  ***    OBJECTIVE:  Current objective measures: ***          Therapeutic Exercises (CPT 16221):     1. Wall ball squats, x20    2. Supine ext over ball, x2 min    3. Ski jump, 3x30 sec    4. Foam Roller, pec stretch, alt UE OH and lat, marching    5. Birddog, 3\" x5 alt R/L    6. Multifidi sidesteps w/pink TB, x10 ea    7. Ball bridges    8. Bridge with march    Therapeutic Treatments and Modalities:     1. E Stim Unattended (CPT 29137), IFC and MH to low back in prone, x15 min    Time-based treatments/modalities:           Pain rating (1-10) before treatment:  {PAIN NUMBERS_1-10:13688}  Pain rating (1-10) after treatment:  {PAIN NUMBERS_1-10:82565}    ASSESSMENT:   Response to treatment: ***    PLAN/RECOMMENDATIONS:   Plan for treatment: {AMB OP THERAPY - THERAPY PLAN:903082303::\"therapy treatment to continue next visit\"}.  Planned interventions for next visit: {PT PLANNED THERAPY INTERVENTIONS:303908956}.       "

## 2020-07-10 ENCOUNTER — HOSPITAL ENCOUNTER (OUTPATIENT)
Facility: MEDICAL CENTER | Age: 20
End: 2020-07-10
Attending: PHYSICIAN ASSISTANT
Payer: MEDICAID

## 2020-07-10 ENCOUNTER — OFFICE VISIT (OUTPATIENT)
Dept: URGENT CARE | Facility: CLINIC | Age: 20
End: 2020-07-10
Payer: MEDICAID

## 2020-07-10 VITALS
RESPIRATION RATE: 14 BRPM | DIASTOLIC BLOOD PRESSURE: 80 MMHG | WEIGHT: 149 LBS | TEMPERATURE: 99.5 F | HEART RATE: 127 BPM | OXYGEN SATURATION: 96 % | SYSTOLIC BLOOD PRESSURE: 110 MMHG | BODY MASS INDEX: 27.42 KG/M2 | HEIGHT: 62 IN

## 2020-07-10 DIAGNOSIS — Z72.51 HIGH RISK HETEROSEXUAL BEHAVIOR: ICD-10-CM

## 2020-07-10 PROCEDURE — 99202 OFFICE O/P NEW SF 15 MIN: CPT | Performed by: PHYSICIAN ASSISTANT

## 2020-07-10 PROCEDURE — 87491 CHLMYD TRACH DNA AMP PROBE: CPT

## 2020-07-10 PROCEDURE — 87591 N.GONORRHOEAE DNA AMP PROB: CPT

## 2020-07-10 ASSESSMENT — ENCOUNTER SYMPTOMS
CHILLS: 0
ABDOMINAL PAIN: 0
DIARRHEA: 0
SHORTNESS OF BREATH: 0
NAUSEA: 0
CONSTIPATION: 0
COUGH: 0
EYE PAIN: 0
FEVER: 0
HEADACHES: 0
MYALGIAS: 0
SORE THROAT: 0
VOMITING: 0

## 2020-07-10 ASSESSMENT — FIBROSIS 4 INDEX: FIB4 SCORE: 0.48

## 2020-07-10 NOTE — PROGRESS NOTES
"Subjective:     Joann Medley is a 19 y.o. female who presents for Exposure to STD (mightve been exposed to chlamydia// no symptoms )      This is a 19-year-old female who presents to urgent care for an STD check.  She reports that around 1 week ago she had protected intercourse with a single male heterosexual partner.  They use barrier protection in the form of a condom and he subsequently informed her that he was having penile discharge and tested positive for chlamydia.  She is requesting testing for chlamydia today.  She is not having any symptoms whatsoever and has no history of STDs.      Review of Systems   Constitutional: Negative for chills and fever.   HENT: Negative for congestion, ear pain and sore throat.    Eyes: Negative for pain.   Respiratory: Negative for cough and shortness of breath.    Cardiovascular: Negative for chest pain.   Gastrointestinal: Negative for abdominal pain, constipation, diarrhea, nausea and vomiting.   Genitourinary: Negative for dysuria.   Musculoskeletal: Negative for myalgias.   Skin: Negative for rash.   Neurological: Negative for headaches.        Allergies:   No Known Allergies  Current Medications:  • acetaminophen Tabs  • amoxicillin Caps  • ibuprofen Tabs  • PRENATAL 1 PO    (Allergies, Medications, & Tobacco/Substance Use were reconciled by the Medical Assistant and reviewed by myself. )       Objective:     /80 (BP Location: Right arm, Patient Position: Sitting, BP Cuff Size: Adult long)   Pulse (!) 127   Temp 37.5 °C (99.5 °F) (Temporal)   Resp 14   Ht 1.575 m (5' 2\")   Wt 67.6 kg (149 lb)   SpO2 96%   BMI 27.25 kg/m²     Physical Exam  Vitals signs reviewed.   Constitutional:       Appearance: Normal appearance.   HENT:      Head: Normocephalic and atraumatic.      Right Ear: External ear normal.      Left Ear: External ear normal.      Nose: Nose normal.      Mouth/Throat:      Mouth: Mucous membranes are moist.   Eyes:      " Conjunctiva/sclera: Conjunctivae normal.   Cardiovascular:      Rate and Rhythm: Normal rate.   Pulmonary:      Effort: Pulmonary effort is normal.   Genitourinary:     Comments: Patient declined pelvic exam.   Skin:     General: Skin is warm and dry.      Capillary Refill: Capillary refill takes less than 2 seconds.   Neurological:      Mental Status: She is alert and oriented to person, place, and time.         Assessment/Plan:     Diagnosis and associated orders:     1. High risk heterosexual behavior  Chlamydia/GC PCR Urine Or Swab      Comments/MDM:     • URINE test for chlam. Urged to go to health department for full panel.  Return if symptoms.            Differential diagnosis, natural history, supportive care, and indications for immediate follow-up discussed.    Advised the patient to follow-up with the primary care physician for recheck, reevaluation, and consideration of further management.    Please note that this dictation was created using voice recognition software. I have made reasonable attempt to correct obvious errors, but I expect that there are errors of grammar and possibly content that I did not discover before finalizing the note.    This note was electronically signed by Zain Hernandez PA-C

## 2020-07-11 LAB
C TRACH DNA SPEC QL NAA+PROBE: POSITIVE
N GONORRHOEA DNA SPEC QL NAA+PROBE: NEGATIVE
SPECIMEN SOURCE: ABNORMAL

## 2020-07-12 ENCOUNTER — TELEPHONE (OUTPATIENT)
Dept: URGENT CARE | Facility: CLINIC | Age: 20
End: 2020-07-12

## 2020-07-12 ENCOUNTER — TELEPHONE (OUTPATIENT)
Dept: URGENT CARE | Facility: PHYSICIAN GROUP | Age: 20
End: 2020-07-12

## 2020-07-12 DIAGNOSIS — A74.9 CHLAMYDIA: ICD-10-CM

## 2020-07-12 RX ORDER — AZITHROMYCIN 500 MG/1
1000 TABLET, FILM COATED ORAL ONCE
Qty: 2 TAB | Refills: 0 | Status: SHIPPED | OUTPATIENT
Start: 2020-07-12 | End: 2020-07-12

## 2020-07-12 NOTE — TELEPHONE ENCOUNTER
7/12/2020 @ 2:49PM    Spoke with the patient regarding her chlamydia and gonorrhea results.  Informed the patient she tested positive for chlamydia.  The patient's gonorrhea test was negative.  Will prescribe the patient Azithromycin to treat her current chlamydia infection.  Advised the patient to abstain from sexual intercourse for the next 7 days while undergoing treatment.  Instructed the patient to inform her sexual partner(s) of the positive result.  Recommend the patient return to clinic for any worsening or concerning symptoms.  The patient had no further questions at this time.

## 2020-08-11 ENCOUNTER — OFFICE VISIT (OUTPATIENT)
Dept: MEDICAL GROUP | Facility: MEDICAL CENTER | Age: 20
End: 2020-08-11
Attending: FAMILY MEDICINE
Payer: MEDICAID

## 2020-08-11 VITALS
HEART RATE: 102 BPM | BODY MASS INDEX: 26.28 KG/M2 | DIASTOLIC BLOOD PRESSURE: 66 MMHG | OXYGEN SATURATION: 95 % | SYSTOLIC BLOOD PRESSURE: 108 MMHG | WEIGHT: 142.8 LBS | TEMPERATURE: 96.8 F | RESPIRATION RATE: 16 BRPM | HEIGHT: 62 IN

## 2020-08-11 DIAGNOSIS — Z11.3 SCREEN FOR STD (SEXUALLY TRANSMITTED DISEASE): ICD-10-CM

## 2020-08-11 PROCEDURE — 99213 OFFICE O/P EST LOW 20 MIN: CPT | Performed by: FAMILY MEDICINE

## 2020-08-11 ASSESSMENT — FIBROSIS 4 INDEX: FIB4 SCORE: 0.48

## 2020-08-12 NOTE — PROGRESS NOTES
Chief Complaint:   Chief Complaint   Patient presents with   • Exposure to STD   • Follow-Up       HPI: Established patient  Joann Medley is a 19 y.o. female who presents for STD screening       Screen for STD (sexually transmitted disease)  Patient is here today because she is concerned about STD, she had unprotected sex with her new sexual partner, who was recently diagnosed with chlamydia and treated.  Patient has an IUD for birth control purposes.  She reports no symptoms at this time like abnormal vaginal discharge or lower abdominal pain or pelvic pain.  Denies abnormal bleeding.  Requesting full STD check and screening.          Past medical history, family history, social history and medications reviewed and updated in the record.  Today  Current medications, problem list and allergies reviewed in EPIC today  Health maintenance topics are reviewed and updated.    There are no active problems to display for this patient.    Family History   Problem Relation Age of Onset   • Cancer Paternal Grandmother         ?     Social History     Socioeconomic History   • Marital status: Single     Spouse name: Not on file   • Number of children: Not on file   • Years of education: Not on file   • Highest education level: Not on file   Occupational History   • Not on file   Social Needs   • Financial resource strain: Not on file   • Food insecurity     Worry: Not on file     Inability: Not on file   • Transportation needs     Medical: Not on file     Non-medical: Not on file   Tobacco Use   • Smoking status: Never Smoker   • Smokeless tobacco: Never Used   Substance and Sexual Activity   • Alcohol use: No     Alcohol/week: 0.0 oz   • Drug use: No   • Sexual activity: Yes     Partners: Male     Birth control/protection: Pill     Comment: Unplanned pregnanacy. Pt states was pregnant while on the pill   Lifestyle   • Physical activity     Days per week: Not on file     Minutes per session: Not on file   •  "Stress: Not on file   Relationships   • Social connections     Talks on phone: Not on file     Gets together: Not on file     Attends Gnosticist service: Not on file     Active member of club or organization: Not on file     Attends meetings of clubs or organizations: Not on file     Relationship status: Not on file   • Intimate partner violence     Fear of current or ex partner: Not on file     Emotionally abused: Not on file     Physically abused: Not on file     Forced sexual activity: Not on file   Other Topics Concern   • Behavioral problems No   • Interpersonal relationships No   • Sad or not enjoying activities No   • Suicidal thoughts No   • Poor school performance No   • Reading difficulties No   • Speech difficulties No   • Writing difficulties No   • Inadequate sleep No   • Excessive TV viewing No   • Excessive video game use No   • Inadequate exercise No   • Sports related No   • Poor diet Yes   • Family concerns for drug/alcohol abuse No   • Poor oral hygiene No   • Bike safety Yes   • Family concerns vehicle safety No   Social History Narrative   • Not on file       Current Outpatient Medications   Medication Sig Dispense Refill   • ibuprofen (MOTRIN) 400 MG Tab Take 400 mg by mouth every 6 hours as needed.     • amoxicillin (AMOXIL) 500 MG Cap Take 1 Cap by mouth 3 times a day. (Patient not taking: Reported on 7/10/2020) 30 Cap 0   • acetaminophen (TYLENOL) 500 MG Tab Take 500 mg by mouth every 6 hours as needed for Moderate Pain.     • Prenatal MV-Min-Fe Fum-FA-DHA (PRENATAL 1 PO) Take 1 Tab by mouth every day.       No current facility-administered medications for this visit.          Review Of Systems  As documented in HPI above  PHYSICAL EXAMINATION:    /66 (BP Location: Right arm, Patient Position: Sitting, BP Cuff Size: Adult)   Pulse (!) 102   Temp 36 °C (96.8 °F) (Temporal)   Resp 16   Ht 1.575 m (5' 2\")   Wt 64.8 kg (142 lb 12.8 oz)   SpO2 95%   BMI 26.12 kg/m²   Gen.: " Well-developed, well-nourished, no apparent distress, pleasant and cooperative with the examination  HEENT: Normocephalic/atraumatic,    Neck: No JVD or bruits, no adenopathy  Cor: Regular rate and rhythm without murmur gallop or rub    Abdomen: Soft nontender without hepatosplenomegaly or masses appreciated, normoactive bowel sounds  Extremities: No cyanosis, clubbing or edema          ASSESSMENT/Plan:  1. Screen for STD (sexually transmitted disease)   patient counseled for safe sexual behavior, will do full STD screening.  CHLAMYDIA/GC PCR URINE OR SWAB    HIV AG/AB COMBO ASSAY SCREENING    HEP C VIRUS ANTIBODY    RPR (SYPHILIS)    HSV I/II IGG & IGM SERUM       Please note that this dictation was created using voice recognition software. I have made every reasonable attempt to correct obvious errors but there may be errors of grammar and content that I may have overlooked prior to finalization of this note.

## 2020-08-25 ENCOUNTER — HOSPITAL ENCOUNTER (OUTPATIENT)
Dept: LAB | Facility: MEDICAL CENTER | Age: 20
End: 2020-08-25
Attending: FAMILY MEDICINE
Payer: MEDICAID

## 2020-08-25 DIAGNOSIS — Z11.3 SCREEN FOR STD (SEXUALLY TRANSMITTED DISEASE): ICD-10-CM

## 2020-08-25 LAB
HCV AB SER QL: NORMAL
HIV 1+2 AB+HIV1 P24 AG SERPL QL IA: NORMAL
TREPONEMA PALLIDUM IGG+IGM AB [PRESENCE] IN SERUM OR PLASMA BY IMMUNOASSAY: NORMAL

## 2020-08-25 PROCEDURE — 86803 HEPATITIS C AB TEST: CPT

## 2020-08-25 PROCEDURE — 86694 HERPES SIMPLEX NES ANTBDY: CPT | Mod: 91

## 2020-08-25 PROCEDURE — 87591 N.GONORRHOEAE DNA AMP PROB: CPT

## 2020-08-25 PROCEDURE — 87491 CHLMYD TRACH DNA AMP PROBE: CPT

## 2020-08-25 PROCEDURE — 86780 TREPONEMA PALLIDUM: CPT

## 2020-08-25 PROCEDURE — 87389 HIV-1 AG W/HIV-1&-2 AB AG IA: CPT

## 2020-08-25 PROCEDURE — 36415 COLL VENOUS BLD VENIPUNCTURE: CPT

## 2020-08-27 LAB
C TRACH DNA SPEC QL NAA+PROBE: NEGATIVE
N GONORRHOEA DNA SPEC QL NAA+PROBE: NEGATIVE
SPECIMEN SOURCE: NORMAL

## 2020-08-28 LAB
HSV1+2 IGG SER IA-ACNC: >22.4 IV
HSV1+2 IGM SER IA-ACNC: 0.68 IV

## 2020-09-09 ENCOUNTER — TELEPHONE (OUTPATIENT)
Dept: OBGYN | Facility: CLINIC | Age: 20
End: 2020-09-09

## 2020-09-18 ENCOUNTER — HOSPITAL ENCOUNTER (OUTPATIENT)
Facility: MEDICAL CENTER | Age: 20
End: 2020-09-18
Attending: FAMILY MEDICINE
Payer: MEDICAID

## 2020-09-18 ENCOUNTER — OFFICE VISIT (OUTPATIENT)
Dept: MEDICAL GROUP | Facility: MEDICAL CENTER | Age: 20
End: 2020-09-18
Attending: FAMILY MEDICINE
Payer: MEDICAID

## 2020-09-18 VITALS
WEIGHT: 145 LBS | OXYGEN SATURATION: 96 % | TEMPERATURE: 98 F | HEART RATE: 104 BPM | DIASTOLIC BLOOD PRESSURE: 60 MMHG | SYSTOLIC BLOOD PRESSURE: 100 MMHG | HEIGHT: 62 IN | BODY MASS INDEX: 26.68 KG/M2 | RESPIRATION RATE: 16 BRPM

## 2020-09-18 DIAGNOSIS — Z11.3 SCREEN FOR STD (SEXUALLY TRANSMITTED DISEASE): ICD-10-CM

## 2020-09-18 DIAGNOSIS — Z12.4 CERVICAL CANCER SCREENING: ICD-10-CM

## 2020-09-18 PROCEDURE — 87591 N.GONORRHOEAE DNA AMP PROB: CPT

## 2020-09-18 PROCEDURE — G0101 CA SCREEN;PELVIC/BREAST EXAM: HCPCS | Performed by: FAMILY MEDICINE

## 2020-09-18 PROCEDURE — 87624 HPV HI-RISK TYP POOLED RSLT: CPT

## 2020-09-18 PROCEDURE — 88175 CYTOPATH C/V AUTO FLUID REDO: CPT

## 2020-09-18 PROCEDURE — 99213 OFFICE O/P EST LOW 20 MIN: CPT | Performed by: FAMILY MEDICINE

## 2020-09-18 PROCEDURE — 87491 CHLMYD TRACH DNA AMP PROBE: CPT

## 2020-09-18 RX ORDER — AZITHROMYCIN 500 MG/1
1000 TABLET, FILM COATED ORAL
Status: SHIPPED | COMMUNITY
Start: 2020-07-12 | End: 2020-09-21

## 2020-09-18 ASSESSMENT — PATIENT HEALTH QUESTIONNAIRE - PHQ9: CLINICAL INTERPRETATION OF PHQ2 SCORE: 0

## 2020-09-18 ASSESSMENT — FIBROSIS 4 INDEX: FIB4 SCORE: 0.5

## 2020-09-18 NOTE — PROGRESS NOTES
Chief Complaint:   Chief Complaint   Patient presents with   • Gynecologic Exam       HPI: Established patient  Joann Medley is a 20 y.o. female who presents for cervical cancer screening screening    Cervical cancer screening/ Screen for STD (sexually transmitted disease)    First Pap today, patient has multiple partners since the age of 16 around 4.  New partner for the past 1 month.  Does not use protection.  IUD for contraception.  No history of abnormal bleeding or discharge.  Denies pelvic pain or lower abdominal pain.  Would like STD check and screening since she has a new partner        Past medical history, family history, social history and medications reviewed and updated in the record. Today   Current medications, problem list and allergies reviewed in Epic today   Health maintenance topics are reviewed and updated.    There are no active problems to display for this patient.    Family History   Problem Relation Age of Onset   • Cancer Paternal Grandmother         ?     Social History     Socioeconomic History   • Marital status: Single     Spouse name: Not on file   • Number of children: Not on file   • Years of education: Not on file   • Highest education level: Not on file   Occupational History   • Not on file   Social Needs   • Financial resource strain: Not on file   • Food insecurity     Worry: Not on file     Inability: Not on file   • Transportation needs     Medical: Not on file     Non-medical: Not on file   Tobacco Use   • Smoking status: Never Smoker   • Smokeless tobacco: Never Used   Substance and Sexual Activity   • Alcohol use: No     Alcohol/week: 0.0 oz   • Drug use: No   • Sexual activity: Yes     Partners: Male     Birth control/protection: Pill     Comment: Unplanned pregnanacy. Pt states was pregnant while on the pill   Lifestyle   • Physical activity     Days per week: Not on file     Minutes per session: Not on file   • Stress: Not on file   Relationships   • Social  "connections     Talks on phone: Not on file     Gets together: Not on file     Attends Cheondoism service: Not on file     Active member of club or organization: Not on file     Attends meetings of clubs or organizations: Not on file     Relationship status: Not on file   • Intimate partner violence     Fear of current or ex partner: Not on file     Emotionally abused: Not on file     Physically abused: Not on file     Forced sexual activity: Not on file   Other Topics Concern   • Behavioral problems No   • Interpersonal relationships No   • Sad or not enjoying activities No   • Suicidal thoughts No   • Poor school performance No   • Reading difficulties No   • Speech difficulties No   • Writing difficulties No   • Inadequate sleep No   • Excessive TV viewing No   • Excessive video game use No   • Inadequate exercise No   • Sports related No   • Poor diet Yes   • Family concerns for drug/alcohol abuse No   • Poor oral hygiene No   • Bike safety Yes   • Family concerns vehicle safety No   Social History Narrative   • Not on file     Current Outpatient Medications   Medication Sig Dispense Refill   • azithromycin (ZITHROMAX) 500 MG tablet Take 1,000 mg by mouth.     • ibuprofen (MOTRIN) 400 MG Tab Take 400 mg by mouth every 6 hours as needed.     • amoxicillin (AMOXIL) 500 MG Cap Take 1 Cap by mouth 3 times a day. (Patient not taking: Reported on 7/10/2020) 30 Cap 0   • acetaminophen (TYLENOL) 500 MG Tab Take 500 mg by mouth every 6 hours as needed for Moderate Pain.     • Prenatal MV-Min-Fe Fum-FA-DHA (PRENATAL 1 PO) Take 1 Tab by mouth every day.       No current facility-administered medications for this visit.            Review Of Systems  As documented in HPI above  PHYSICAL EXAMINATION:    /60 (BP Location: Left arm, Patient Position: Sitting, BP Cuff Size: Adult)   Pulse (!) 104   Temp 36.7 °C (98 °F) (Temporal)   Resp 16   Ht 1.575 m (5' 2\")   Wt 65.8 kg (145 lb)   SpO2 96%   Breastfeeding Yes   " BMI 26.52 kg/m²   Gen.: Well-developed, well-nourished, no apparent distress, pleasant and cooperative with the examination  HEENT: Normocephalic/atraumatic,   Normal Vulval and Vaginal no suspicious lesion or rash  Cx posterior cervix, os closed, noted the strings of her IUD in place.  No bleeding or abnormal discharge  Uterus normal size uterus  Adnexa no mass or tenderness  No masses no lesions   Pap done     ASSESSMENT/Plan:    1. Cervical cancer screening  THINPREP PAP WITH HPV   2. Screen for STD (sexually transmitted disease)  CHLAMYDIA/GC PCR URINE OR SWAB     Please note that this dictation was created using voice recognition software. I have made every reasonable attempt to correct obvious errors but there may be errors of grammar and content that I may have overlooked prior to finalization of this note.

## 2020-09-19 DIAGNOSIS — Z12.4 CERVICAL CANCER SCREENING: ICD-10-CM

## 2020-09-20 LAB
C TRACH DNA SPEC QL NAA+PROBE: POSITIVE
N GONORRHOEA DNA SPEC QL NAA+PROBE: POSITIVE
SPECIMEN SOURCE: ABNORMAL

## 2020-09-21 ENCOUNTER — TELEPHONE (OUTPATIENT)
Dept: MEDICAL GROUP | Facility: MEDICAL CENTER | Age: 20
End: 2020-09-21

## 2020-09-21 DIAGNOSIS — A54.9 GONORRHEA: ICD-10-CM

## 2020-09-21 DIAGNOSIS — A74.9 CHLAMYDIA: ICD-10-CM

## 2020-09-21 LAB
CYTOLOGY REG CYTOL: NORMAL
HPV HR 12 DNA CVX QL NAA+PROBE: NEGATIVE
HPV16 DNA SPEC QL NAA+PROBE: NEGATIVE
HPV18 DNA SPEC QL NAA+PROBE: NEGATIVE
SPECIMEN SOURCE: NORMAL

## 2020-09-21 RX ORDER — AZITHROMYCIN 500 MG/1
500 TABLET, FILM COATED ORAL ONCE
Qty: 4 TAB | Refills: 0 | Status: SHIPPED | OUTPATIENT
Start: 2020-09-21 | End: 2020-09-25

## 2020-09-21 RX ORDER — AZITHROMYCIN 500 MG/1
500 TABLET, FILM COATED ORAL ONCE
Qty: 4 TAB | Refills: 0 | Status: SHIPPED | OUTPATIENT
Start: 2020-09-21 | End: 2020-09-21 | Stop reason: CLARIF

## 2020-09-21 NOTE — TELEPHONE ENCOUNTER
1. Caller Name:[facility]                        Call Back Number: [phone]      How would the patient prefer to be contacted with a response: Phone call OK to leave a detailed message    [facility] called with positive results for gc chlamydia and gonnorrhea. Please order treatment neccessary so we can inform the pt. Thank you

## 2020-09-22 ENCOUNTER — TELEPHONE (OUTPATIENT)
Dept: MEDICAL GROUP | Facility: MEDICAL CENTER | Age: 20
End: 2020-09-22

## 2020-09-22 DIAGNOSIS — A74.9 CHLAMYDIA INFECTION: ICD-10-CM

## 2020-09-22 PROCEDURE — RXMED WILLOW AMBULATORY MEDICATION CHARGE: Performed by: PHYSICIAN ASSISTANT

## 2020-09-22 NOTE — TELEPHONE ENCOUNTER
Can you call patient please notify her that genital culture is positive for chlamydia and gonorrhea, patient needs treatment immediately I will send prescription of Zithromax to pharmacy for the patient to take it and she needs to come to the clinic to get an injection of ceftriaxone.  Patient also needs to notify and inform her partner for seeking treatment.  Thank you

## 2020-09-24 ENCOUNTER — PHARMACY VISIT (OUTPATIENT)
Dept: PHARMACY | Facility: MEDICAL CENTER | Age: 20
End: 2020-09-24
Payer: COMMERCIAL

## 2020-09-25 ENCOUNTER — TELEPHONE (OUTPATIENT)
Dept: MEDICAL GROUP | Facility: MEDICAL CENTER | Age: 20
End: 2020-09-25

## 2020-09-25 ENCOUNTER — OFFICE VISIT (OUTPATIENT)
Dept: MEDICAL GROUP | Facility: MEDICAL CENTER | Age: 20
End: 2020-09-25
Attending: FAMILY MEDICINE
Payer: MEDICAID

## 2020-09-25 VITALS
RESPIRATION RATE: 14 BRPM | DIASTOLIC BLOOD PRESSURE: 72 MMHG | SYSTOLIC BLOOD PRESSURE: 110 MMHG | HEART RATE: 104 BPM | WEIGHT: 146.1 LBS | HEIGHT: 62 IN | TEMPERATURE: 97.3 F | BODY MASS INDEX: 26.89 KG/M2 | OXYGEN SATURATION: 98 %

## 2020-09-25 DIAGNOSIS — A54.9 GONORRHEA: ICD-10-CM

## 2020-09-25 DIAGNOSIS — A74.9 CHLAMYDIA: ICD-10-CM

## 2020-09-25 PROCEDURE — 99213 OFFICE O/P EST LOW 20 MIN: CPT | Performed by: FAMILY MEDICINE

## 2020-09-25 RX ORDER — AZITHROMYCIN 500 MG/1
1000 TABLET, FILM COATED ORAL ONCE
Qty: 2 TAB | Refills: 0 | Status: SHIPPED | OUTPATIENT
Start: 2020-09-25 | End: 2020-09-25

## 2020-09-25 RX ORDER — CEFTRIAXONE SODIUM 250 MG/1
250 INJECTION, POWDER, FOR SOLUTION INTRAMUSCULAR; INTRAVENOUS ONCE
Status: COMPLETED | OUTPATIENT
Start: 2020-09-25 | End: 2020-09-25

## 2020-09-25 RX ADMIN — CEFTRIAXONE SODIUM 250 MG: 250 INJECTION, POWDER, FOR SOLUTION INTRAMUSCULAR; INTRAVENOUS at 17:56

## 2020-09-25 ASSESSMENT — FIBROSIS 4 INDEX: FIB4 SCORE: 0.5

## 2020-09-25 NOTE — TELEPHONE ENCOUNTER
VOICEMAIL  1. Caller Name: Joann                         Call Back Number:     2. Message:      Joann had called regarding having a question on her medication and was unsure if she needed a shot.        I had check her chart and the message below is from the provider;              Can you call patient please notify her that genital culture is positive for chlamydia and gonorrhea, patient needs treatment immediately I will send prescription of Zithromax to pharmacy for the patient to take it and she needs to come to the clinic to get an injection of ceftriaxone.  Patient also needs to notify and inform her partner for seeking treatment.  Thank you

## 2020-09-29 DIAGNOSIS — Z87.09 HISTORY OF ASTHMA: ICD-10-CM

## 2020-09-29 PROCEDURE — RXMED WILLOW AMBULATORY MEDICATION CHARGE: Performed by: FAMILY MEDICINE

## 2020-09-29 RX ORDER — ALBUTEROL SULFATE 90 UG/1
2 AEROSOL, METERED RESPIRATORY (INHALATION) EVERY 6 HOURS PRN
Qty: 8.5 G | Refills: 1 | Status: SHIPPED | OUTPATIENT
Start: 2020-09-29 | End: 2020-12-23

## 2020-10-01 ENCOUNTER — PHARMACY VISIT (OUTPATIENT)
Dept: PHARMACY | Facility: MEDICAL CENTER | Age: 20
End: 2020-10-01
Payer: COMMERCIAL

## 2020-12-08 ENCOUNTER — HOSPITAL ENCOUNTER (EMERGENCY)
Facility: MEDICAL CENTER | Age: 20
End: 2020-12-08
Attending: EMERGENCY MEDICINE
Payer: MEDICAID

## 2020-12-08 VITALS
WEIGHT: 153 LBS | BODY MASS INDEX: 28.16 KG/M2 | TEMPERATURE: 97.7 F | DIASTOLIC BLOOD PRESSURE: 80 MMHG | RESPIRATION RATE: 16 BRPM | OXYGEN SATURATION: 97 % | HEART RATE: 90 BPM | SYSTOLIC BLOOD PRESSURE: 126 MMHG | HEIGHT: 62 IN

## 2020-12-08 DIAGNOSIS — S05.02XA ABRASION OF LEFT CORNEA, INITIAL ENCOUNTER: ICD-10-CM

## 2020-12-08 PROCEDURE — 99283 EMERGENCY DEPT VISIT LOW MDM: CPT

## 2020-12-08 RX ORDER — BACITRACIN ZINC AND POLYMYXIN B SULFATE 500; 10000 [USP'U]/G; [USP'U]/G
0.5 OINTMENT OPHTHALMIC EVERY 12 HOURS
Qty: 3.5 G | Refills: 0 | Status: SHIPPED | OUTPATIENT
Start: 2020-12-08 | End: 2020-12-23

## 2020-12-08 RX ORDER — PROPARACAINE HYDROCHLORIDE 5 MG/ML
1 SOLUTION/ DROPS OPHTHALMIC ONCE
Status: DISCONTINUED | OUTPATIENT
Start: 2020-12-08 | End: 2020-12-08 | Stop reason: HOSPADM

## 2020-12-08 ASSESSMENT — ENCOUNTER SYMPTOMS
COUGH: 0
FEVER: 0

## 2020-12-08 ASSESSMENT — FIBROSIS 4 INDEX: FIB4 SCORE: 0.5
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
## 2020-12-08 NOTE — ED NOTES
Med rec updated and complete  Allergies reviewed  Pt reports no prescription medications, OTC's, or vitamins   Pt reports no antibiotics in the last 2 weeks

## 2020-12-08 NOTE — ED NOTES
Seen by ERP. Discharge instructions provided.  Pt verbalized the understanding of discharge instructions to follow up with PCP and to return to ER if condition worsens.  Pt ambulated out of ER without difficulty. RX 1.

## 2020-12-08 NOTE — ED NOTES
AIDET acknowledged with patient. OS with some redness to the corner of her eye. No drainage or visual disturbance per pt. Gurney in low position, side rail up for pt safety. Call light within reach. Will continue to monitor.
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
## 2020-12-23 ENCOUNTER — HOSPITAL ENCOUNTER (OUTPATIENT)
Dept: RADIOLOGY | Facility: MEDICAL CENTER | Age: 20
End: 2020-12-23
Attending: NURSE PRACTITIONER
Payer: MEDICAID

## 2020-12-23 ENCOUNTER — TELEPHONE (OUTPATIENT)
Dept: URGENT CARE | Facility: CLINIC | Age: 20
End: 2020-12-23

## 2020-12-23 ENCOUNTER — HOSPITAL ENCOUNTER (OUTPATIENT)
Facility: MEDICAL CENTER | Age: 20
End: 2020-12-23
Attending: NURSE PRACTITIONER
Payer: MEDICAID

## 2020-12-23 ENCOUNTER — OFFICE VISIT (OUTPATIENT)
Dept: URGENT CARE | Facility: CLINIC | Age: 20
End: 2020-12-23
Payer: MEDICAID

## 2020-12-23 VITALS
TEMPERATURE: 97.7 F | DIASTOLIC BLOOD PRESSURE: 70 MMHG | WEIGHT: 154 LBS | HEIGHT: 62 IN | RESPIRATION RATE: 16 BRPM | SYSTOLIC BLOOD PRESSURE: 106 MMHG | BODY MASS INDEX: 28.34 KG/M2 | OXYGEN SATURATION: 97 % | HEART RATE: 89 BPM

## 2020-12-23 DIAGNOSIS — N93.9 ABNORMAL VAGINAL BLEEDING: ICD-10-CM

## 2020-12-23 DIAGNOSIS — R09.81 NASAL CONGESTION: ICD-10-CM

## 2020-12-23 LAB
BASOPHILS # BLD AUTO: 1.3 % (ref 0–1.8)
BASOPHILS # BLD: 0.03 K/UL (ref 0–0.12)
EOSINOPHIL # BLD AUTO: 0.01 K/UL (ref 0–0.51)
EOSINOPHIL NFR BLD: 0.4 % (ref 0–6.9)
ERYTHROCYTE [DISTWIDTH] IN BLOOD BY AUTOMATED COUNT: 41.9 FL (ref 35.9–50)
HCT VFR BLD AUTO: 41.6 % (ref 37–47)
HGB BLD-MCNC: 13 G/DL (ref 12–16)
IMM GRANULOCYTES # BLD AUTO: 0 K/UL (ref 0–0.11)
IMM GRANULOCYTES NFR BLD AUTO: 0 % (ref 0–0.9)
INT CON NEG: NORMAL
INT CON POS: NORMAL
LYMPHOCYTES # BLD AUTO: 1.39 K/UL (ref 1–4.8)
LYMPHOCYTES NFR BLD: 62.3 % (ref 22–41)
MCH RBC QN AUTO: 28.1 PG (ref 27–33)
MCHC RBC AUTO-ENTMCNC: 31.3 G/DL (ref 33.6–35)
MCV RBC AUTO: 89.8 FL (ref 81.4–97.8)
MONOCYTES # BLD AUTO: 0.18 K/UL (ref 0–0.85)
MONOCYTES NFR BLD AUTO: 8.1 % (ref 0–13.4)
NEUTROPHILS # BLD AUTO: 0.62 K/UL (ref 2–7.15)
NEUTROPHILS NFR BLD: 27.9 % (ref 44–72)
NRBC # BLD AUTO: 0 K/UL
NRBC BLD-RTO: 0 /100 WBC
PLATELET # BLD AUTO: 190 K/UL (ref 164–446)
PMV BLD AUTO: 11.8 FL (ref 9–12.9)
POC URINE PREGNANCY TEST: NEGATIVE
RBC # BLD AUTO: 4.63 M/UL (ref 4.2–5.4)
WBC # BLD AUTO: 2.4 K/UL (ref 4.8–10.8)

## 2020-12-23 PROCEDURE — 85025 COMPLETE CBC W/AUTO DIFF WBC: CPT

## 2020-12-23 PROCEDURE — 99213 OFFICE O/P EST LOW 20 MIN: CPT | Performed by: NURSE PRACTITIONER

## 2020-12-23 PROCEDURE — 76830 TRANSVAGINAL US NON-OB: CPT

## 2020-12-23 PROCEDURE — 81025 URINE PREGNANCY TEST: CPT | Performed by: NURSE PRACTITIONER

## 2020-12-23 ASSESSMENT — FIBROSIS 4 INDEX: FIB4 SCORE: 0.5

## 2020-12-23 NOTE — PROGRESS NOTES
"  Chief Complaint   Patient presents with   • Vaginal Bleeding     \"abnormal vaginal bleeding\" x11 days, has IUD x1.5 years       HISTORY OF PRESENT ILLNESS: Patient is a 20 y.o. female who presents to urgent care today with complaints of abnormal vaginal bleeding.  Her symptoms started on 12/11/2020 with abdominal cramping and vaginal bleeding.  Her cramping has subsided but she has continued to have vaginal bleeding since.  She is using approximately 4 tampons per day.  She does have an IUD in place(since August 2019) and states that periods are abnormal for her although she does have intermittent spotting from time to time.  She otherwise feels well denies any fever, chills, malaise, abdominal pain, dizziness, fatigue.  Denies previous history of the same.    There are no active problems to display for this patient.      Allergies:Patient has no known allergies.    No current [facility]-ordered outpatient medications on file.     No current [facility]-ordered facility-administered medications on file.        Past Medical History:   Diagnosis Date   • Anemia in pregnancy 4/20/2019       Social History     Tobacco Use   • Smoking status: Never Smoker   • Smokeless tobacco: Never Used   Substance Use Topics   • Alcohol use: No     Alcohol/week: 0.0 oz   • Drug use: No       Family Status   Relation Name Status   • Fa  Alive   • PGMo  (Not Specified)   • Mo  Alive     Family History   Problem Relation Age of Onset   • Cancer Paternal Grandmother         ?       ROS:  Review of Systems   Constitutional: Negative for fever, chills, weight loss, malaise, and fatigue.   HENT: Negative for ear pain, nosebleeds, congestion, sore throat and neck pain.    Eyes: Negative for vision changes.   Neuro: Negative for headache, sensory changes, weakness, seizure, LOC.   Cardiovascular: Negative for chest pain, palpitations, orthopnea and leg swelling.   Respiratory: Negative for cough, sputum production, shortness of breath and " "wheezing.   Gastrointestinal: Negative for abdominal pain, nausea, vomiting or diarrhea.   Genitourinary: Negative for dysuria, urgency and frequency.  GYN: Positive for abnormal vaginal bleeding.    Musculoskeletal: Negative for falls, neck pain, back pain, joint pain, myalgias.   Skin: Negative for rash, diaphoresis.     Exam:  /70   Pulse 89   Temp 36.5 °C (97.7 °F) (Temporal)   Resp 16   Ht 1.575 m (5' 2\")   Wt 69.9 kg (154 lb)   SpO2 97%   General: well-nourished, well-developed female in NAD  Head: normocephalic, atraumatic  Eyes: PERRLA, no conjunctival injection, acuity grossly intact, lids normal.  Ears: normal shape and symmetry, no tenderness, no discharge. External canals are without any significant edema or erythema. Tympanic membranes are without any inflammation, no effusion. Gross auditory acuity is intact.  Nose: symmetrical without tenderness, no discharge.  Mouth/Throat: reasonable hygiene, no erythema, exudates or tonsillar enlargement.  Neck: no masses, range of motion within normal limits, no tracheal deviation. No obvious thyroid enlargement.   Lymph: no cervical adenopathy. No supraclavicular adenopathy.   Neuro: alert and oriented. Cranial nerves 1-12 grossly intact. No sensory deficit.   Cardiovascular: regular rate and rhythm. No edema.  Pulmonary: no distress. Chest is symmetrical with respiration, no wheezes, crackles, or rhonchi.   Abdomen: soft, non-tender, no guarding, no hepatosplenomegaly.  Musculoskeletal: no clubbing, appropriate muscle tone, gait is stable.  Skin: warm, dry, intact, no clubbing, no cyanosis, no rashes.   Psych: appropriate mood, affect, judgement.       POC pregnancy negative      Assessment/Plan:  1. Abnormal vaginal bleeding  POCT Pregnancy    CBC WITH DIFFERENTIAL    US-PELVIC COMPLETE (TRANSABDOMINAL/TRANSVAGINAL) (COMBO)    CANCELED: US-PELVIC TRANSVAGINAL ONLY       Patient presents with abnormal vaginal bleeding.  Pregnancy is negative in " clinic today.  CBC and ultrasound ordered for today, will call with results.  Referral to GYN is placed.  Supportive care, differential diagnoses, and indications for immediate follow-up discussed with patient.   Pathogenesis of diagnosis discussed including typical length and natural progression.   Instructed to return to clinic or nearest emergency department for any change in condition, further concerns, or worsening of symptoms.  Patient states understanding of the plan of care and discharge instructions.  Instructed to make an appointment, for follow up, with her primary care provider.        Please note that this dictation was created using voice recognition software. I have made every reasonable attempt to correct obvious errors, but I expect that there are errors of grammar and possibly content that I did not discover before finalizing the note.      SAULO Cardona.         Addendum 12/24/20: Ultrasound and lab work reviewed.  Ultrasound does show a small cyst, potential causation of bleeding,-urgent referral into gynecology for follow-up.  The patient does have a critical low white blood cell count.  I discussed this with my supervising physician, Dr. Aquino, he is advised the patient is tested for COVID due to suspicion of viral process.  Patient was called for reevaluation, as a side note she says she does have a slight runny nose, Covid testing ordered.  I discussed the patient's lab results and her ultrasound results with her, she is given STRICT ER precautions.     SAULO Cardona.

## 2020-12-24 DIAGNOSIS — R09.81 NASAL CONGESTION: ICD-10-CM

## 2021-01-27 ENCOUNTER — EMPLOYEE HEALTH (OUTPATIENT)
Dept: OCCUPATIONAL MEDICINE | Facility: CLINIC | Age: 21
End: 2021-01-27

## 2021-01-27 ENCOUNTER — EH NON-PROVIDER (OUTPATIENT)
Dept: OCCUPATIONAL MEDICINE | Facility: CLINIC | Age: 21
End: 2021-01-27

## 2021-01-27 DIAGNOSIS — Z02.89 ENCOUNTER FOR OCCUPATIONAL HEALTH ASSESSMENT: ICD-10-CM

## 2021-01-27 DIAGNOSIS — Z02.89 ENCOUNTER FOR OCCUPATIONAL HEALTH ASSESSMENT: Primary | ICD-10-CM

## 2021-01-27 LAB
AMP AMPHETAMINE: NORMAL
BAR BARBITURATES: NORMAL
BZO BENZODIAZEPINES: NORMAL
COC COCAINE: NORMAL
INT CON NEG: NORMAL
INT CON POS: NORMAL
MDMA ECSTASY: NORMAL
MET METHAMPHETAMINES: NORMAL
MTD METHADONE: NORMAL
OPI OPIATES: NORMAL
OXY OXYCODONE: NORMAL
PCP PHENCYCLIDINE: NORMAL
POC URINE DRUG SCREEN OCDRS: NORMAL
THC: NORMAL

## 2021-01-27 PROCEDURE — 8915 PR COMPREHENSIVE PHYSICAL: Performed by: PREVENTIVE MEDICINE

## 2021-01-27 PROCEDURE — 80305 DRUG TEST PRSMV DIR OPT OBS: CPT | Performed by: NURSE PRACTITIONER

## 2021-01-27 PROCEDURE — 90686 IIV4 VACC NO PRSV 0.5 ML IM: CPT | Performed by: NURSE PRACTITIONER

## 2021-01-27 PROCEDURE — 94375 RESPIRATORY FLOW VOLUME LOOP: CPT | Performed by: NURSE PRACTITIONER

## 2021-01-27 NOTE — NON-PROVIDER
covid vaccine was ordered and patient was told to wait two week from today before making her apt.

## 2021-02-17 ENCOUNTER — EH NON-PROVIDER (OUTPATIENT)
Dept: OCCUPATIONAL MEDICINE | Facility: CLINIC | Age: 21
End: 2021-02-17

## 2021-02-17 DIAGNOSIS — Z71.85 IMMUNIZATION COUNSELING: ICD-10-CM

## 2021-02-19 ENCOUNTER — OFFICE VISIT (OUTPATIENT)
Dept: URGENT CARE | Facility: CLINIC | Age: 21
End: 2021-02-19
Payer: MEDICAID

## 2021-02-19 ENCOUNTER — HOSPITAL ENCOUNTER (OUTPATIENT)
Facility: MEDICAL CENTER | Age: 21
End: 2021-02-19
Attending: PHYSICIAN ASSISTANT
Payer: MEDICAID

## 2021-02-19 VITALS
TEMPERATURE: 97.4 F | WEIGHT: 145 LBS | BODY MASS INDEX: 26.68 KG/M2 | SYSTOLIC BLOOD PRESSURE: 110 MMHG | HEART RATE: 98 BPM | HEIGHT: 62 IN | OXYGEN SATURATION: 95 % | DIASTOLIC BLOOD PRESSURE: 80 MMHG | RESPIRATION RATE: 16 BRPM

## 2021-02-19 DIAGNOSIS — Z20.2 POSSIBLE EXPOSURE TO STD: ICD-10-CM

## 2021-02-19 DIAGNOSIS — N93.0 PCB (POST COITAL BLEEDING): ICD-10-CM

## 2021-02-19 LAB
APPEARANCE UR: NORMAL
BILIRUB UR STRIP-MCNC: NEGATIVE MG/DL
COLOR UR AUTO: YELLOW
GLUCOSE UR STRIP.AUTO-MCNC: NEGATIVE MG/DL
INT CON NEG: NEGATIVE
INT CON POS: POSITIVE
KETONES UR STRIP.AUTO-MCNC: NEGATIVE MG/DL
LEUKOCYTE ESTERASE UR QL STRIP.AUTO: NORMAL
NITRITE UR QL STRIP.AUTO: NEGATIVE
PH UR STRIP.AUTO: 6.5 [PH] (ref 5–8)
POC URINE PREGNANCY TEST: NEGATIVE
PROT UR QL STRIP: 30 MG/DL
RBC UR QL AUTO: NORMAL
SP GR UR STRIP.AUTO: 1.02
UROBILINOGEN UR STRIP-MCNC: 1 MG/DL

## 2021-02-19 PROCEDURE — 87510 GARDNER VAG DNA DIR PROBE: CPT

## 2021-02-19 PROCEDURE — 99213 OFFICE O/P EST LOW 20 MIN: CPT | Performed by: PHYSICIAN ASSISTANT

## 2021-02-19 PROCEDURE — 81025 URINE PREGNANCY TEST: CPT | Performed by: PHYSICIAN ASSISTANT

## 2021-02-19 PROCEDURE — 87491 CHLMYD TRACH DNA AMP PROBE: CPT

## 2021-02-19 PROCEDURE — 87660 TRICHOMONAS VAGIN DIR PROBE: CPT

## 2021-02-19 PROCEDURE — 81002 URINALYSIS NONAUTO W/O SCOPE: CPT | Performed by: PHYSICIAN ASSISTANT

## 2021-02-19 PROCEDURE — 87591 N.GONORRHOEAE DNA AMP PROB: CPT

## 2021-02-19 PROCEDURE — 87480 CANDIDA DNA DIR PROBE: CPT

## 2021-02-19 ASSESSMENT — ENCOUNTER SYMPTOMS
VOMITING: 0
FEVER: 0
ABDOMINAL PAIN: 0
FLANK PAIN: 0
DIARRHEA: 0
CHILLS: 0

## 2021-02-19 ASSESSMENT — FIBROSIS 4 INDEX: FIB4 SCORE: 0.53

## 2021-02-19 NOTE — PROGRESS NOTES
"Subjective:      Joann Medley is a 20 y.o. female who presents with Sexually Transmitted Diseases (had unprotected sex)            Patient is a 20-year-old female who presents to urgent care concern regarding possible STD.  Patient reports 3 days ago she did have unprotected sex of which she did have some postcoital bleeding.  She reports that if this happens sometimes in the past but also noted that this can be a sign of STD prompting evaluation today.  Patient denies any pain, dyspareunia, urinary symptoms of dysuria.  Patient does report prior history of STD however she was asymptomatic at that time.  She reports recent partner with male and they did not utilize condoms.  This partner she believes is currently asymptomatic.  Birth control- IUD.       Sexually Transmitted Diseases  This is a new problem. The problem has been unchanged. Pertinent negatives include no abdominal pain, chills, fever, urinary symptoms or vomiting. Nothing aggravates the symptoms. She has tried nothing for the symptoms.       Review of Systems   Constitutional: Negative for chills and fever.   Gastrointestinal: Negative for abdominal pain, diarrhea and vomiting.   Genitourinary: Negative for dysuria, flank pain, frequency, hematuria and urgency.        Postcoital bleeding.   All other systems reviewed and are negative.         Objective:     /80 (BP Location: Right arm, Patient Position: Sitting, BP Cuff Size: Adult long)   Pulse 98   Temp 36.3 °C (97.4 °F) (Temporal)   Resp 16   Ht 1.575 m (5' 2\")   Wt 65.8 kg (145 lb)   LMP 02/01/2021 (Exact Date)   SpO2 95%   BMI 26.52 kg/m²    PMH:  has a past medical history of Anemia in pregnancy (4/20/2019). She also has no past medical history of Addisons disease ([facility]), Adrenal disorder ([facility]), Allergy, Anxiety, Blood transfusion without reported diagnosis, Clotting disorder, Cushings syndrome, Depression, Diabetic neuropathy ([facility]), GERD (gastroesophageal reflux " disease), Goiter, Head ache, HIV (human immunodeficiency virus infection), Hyperlipidemia, IBD (inflammatory bowel disease), Meningitis, Migraine, Muscle disorder, Osteoporosis, Parathyroid disorder (HCC), Pituitary disease (HCC), Pulmonary emphysema (HCC), Substance abuse (HCC), Tuberculosis, Ulcer, or Urinary tract infection, site not specified.  MEDS: Reviewed .   ALLERGIES: No Known Allergies  SURGHX: No past surgical history on file.  SOCHX:  reports that she has never smoked. She has never used smokeless tobacco. She reports that she does not drink alcohol and does not use drugs.  FH: Family history was reviewed, no pertinent findings to report    Physical Exam  Vitals reviewed.   Constitutional:       General: She is not in acute distress.     Appearance: She is well-developed.   HENT:      Head: Normocephalic and atraumatic.   Eyes:      Conjunctiva/sclera: Conjunctivae normal.      Pupils: Pupils are equal, round, and reactive to light.   Neck:      Trachea: No tracheal deviation.   Cardiovascular:      Rate and Rhythm: Normal rate and regular rhythm.      Heart sounds: No murmur.   Pulmonary:      Effort: Pulmonary effort is normal. No respiratory distress.      Breath sounds: Normal breath sounds.   Musculoskeletal:         General: Normal range of motion.      Cervical back: Normal range of motion and neck supple.   Skin:     General: Skin is warm.   Neurological:      Mental Status: She is alert and oriented to person, place, and time.      Coordination: Coordination normal.   Psychiatric:         Behavior: Behavior normal.         Thought Content: Thought content normal.         Judgment: Judgment normal.              hCG negative  UA-moderate blood.  Assessment/Plan:        1. Possible exposure to STD  - VAGINAL PATHOGENS DNA PANEL; Future  - CHLAMYDIA/GC PCR URINE OR SWAB; Future  - HIV AG/AB COMBO ASSAY SCREENING; Future  - HEPATITIS PANEL ACUTE(4 COMPONENTS); Future  - RPR    2. PCB (post coital  bleeding)    We will sign off for the above at this time.  Patient opted to conduct self swab.  Patient requesting blood work as well for HIV, hepatitis along with syphilis as well.  I will treat if needed based on return of blood work.  [facility].  Appropriate PPE worn at all times by provider.   Pt. Had face mask on throughout entirety of the visit other than oropharyngeal examination today.       DDX, Supportive care, and indications for immediate follow-up discussed with patient.    Instructed to return to clinic or nearest emergency department if we are not available for any change in condition, further concerns, or worsening of symptoms.    The patient and/or guardian demonstrated a good understanding and agreed with the treatment plan.    Please note that this dictation was created using voice recognition software. I have made every reasonable attempt to correct obvious errors, but I expect that there are errors of grammar and possibly content that I did not discover before finalizing the note.

## 2021-02-20 LAB
C TRACH DNA SPEC QL NAA+PROBE: NEGATIVE
CANDIDA DNA VAG QL PROBE+SIG AMP: NEGATIVE
G VAGINALIS DNA VAG QL PROBE+SIG AMP: POSITIVE
N GONORRHOEA DNA SPEC QL NAA+PROBE: NEGATIVE
SPECIMEN SOURCE: NORMAL
T VAGINALIS DNA VAG QL PROBE+SIG AMP: NEGATIVE

## 2021-02-21 DIAGNOSIS — N76.0 VAGINOSIS: ICD-10-CM

## 2021-02-21 RX ORDER — METRONIDAZOLE 500 MG/1
500 TABLET ORAL 2 TIMES DAILY
Qty: 14 TABLET | Refills: 0 | Status: SHIPPED | OUTPATIENT
Start: 2021-02-21 | End: 2021-02-28

## 2021-03-12 ENCOUNTER — IMMUNIZATION (OUTPATIENT)
Dept: FAMILY PLANNING/WOMEN'S HEALTH CLINIC | Facility: IMMUNIZATION CENTER | Age: 21
End: 2021-03-12
Attending: NURSE PRACTITIONER
Payer: MEDICAID

## 2021-03-12 DIAGNOSIS — Z23 ENCOUNTER FOR VACCINATION: Primary | ICD-10-CM

## 2021-03-12 DIAGNOSIS — Z02.89 ENCOUNTER FOR OCCUPATIONAL HEALTH ASSESSMENT: ICD-10-CM

## 2021-03-12 PROCEDURE — 0011A MODERNA SARS-COV-2 VACCINE: CPT

## 2021-03-12 PROCEDURE — 91301 MODERNA SARS-COV-2 VACCINE: CPT

## 2021-03-24 ENCOUNTER — OFFICE VISIT (OUTPATIENT)
Dept: MEDICAL GROUP | Facility: MEDICAL CENTER | Age: 21
End: 2021-03-24
Attending: PHYSICIAN ASSISTANT
Payer: MEDICAID

## 2021-03-24 VITALS
WEIGHT: 142 LBS | HEIGHT: 62 IN | OXYGEN SATURATION: 96 % | BODY MASS INDEX: 26.13 KG/M2 | DIASTOLIC BLOOD PRESSURE: 66 MMHG | HEART RATE: 100 BPM | TEMPERATURE: 97 F | RESPIRATION RATE: 16 BRPM | SYSTOLIC BLOOD PRESSURE: 108 MMHG

## 2021-03-24 DIAGNOSIS — Z30.018 ENCOUNTER FOR INITIAL PRESCRIPTION OF OTHER CONTRACEPTIVES: ICD-10-CM

## 2021-03-24 PROCEDURE — 99213 OFFICE O/P EST LOW 20 MIN: CPT | Performed by: PHYSICIAN ASSISTANT

## 2021-03-24 ASSESSMENT — PATIENT HEALTH QUESTIONNAIRE - PHQ9: CLINICAL INTERPRETATION OF PHQ2 SCORE: 0

## 2021-03-24 ASSESSMENT — FIBROSIS 4 INDEX: FIB4 SCORE: 0.53

## 2021-03-24 NOTE — ASSESSMENT & PLAN NOTE
Joann presents today to establish care.    Current contraceptive method: Mirena IUD x2 years - causing irregular periods/menorrhagia, cramping and headaches. She reports that before the IUD was placed, she was on OCP's in which she did not experience any of the above symptoms. She is interested in getting the IUD remove and being placed back on OCP's.   LMP: 3/22/21.  Has an OBGYN appointment coming up to follow up on IUD removal and new finding of an ovarian cyst.     Unacceptable risk:  ?Age ?35 years and smoking ?15 cigarettes per day: None.   ?Multiple risk factors for arterial cardiovascular disease (such as older age, smoking, diabetes, and hypertension): None.   ?Hypertension (systolic ?160 mmHg or diastolic ?100 mmHg): None.   ?Venous thromboembolism: None.  ?Known thrombogenic mutation: None.   ?Known ischemic heart disease: None.  ?History of stroke: None.   ?Complicated valvular heart disease (pulmonary hypertension, risk for atrial fibrillation, history of subacute bacterial endocarditis): None.   ?Current breast cancer: None.   ?Severe (decompensated) cirrhosis: None.    ?Hepatocellular adenoma or malignant hepatoma: None.   ?Migraine with aura: None.   ?Diabetes mellitus of >20 years duration or with nephropathy, retinopathy, or neuropathy: None.

## 2021-03-24 NOTE — PROGRESS NOTES
Chief Complaint   Patient presents with   • Contraception   • Establish Care       Subjective:     HPI:   Joann Medley is a 20 y.o. female here to establish care  and to discuss the evaluation and management of:    Encounter for initial prescription of other contraceptives  Joann presents today to establish care.    Current contraceptive method: Mirena IUD x2 years - causing irregular periods/menorrhagia, cramping and headaches. She reports that before the IUD was placed, she was on OCP's in which she did not experience any of the above symptoms. She is interested in getting the IUD remove and being placed back on OCP's.   LMP: 3/22/21.  Has an OBGYN appointment coming up to follow up on IUD removal and new finding of an ovarian cyst.     Unacceptable risk:  ?Age ?35 years and smoking ?15 cigarettes per day: None.   ?Multiple risk factors for arterial cardiovascular disease (such as older age, smoking, diabetes, and hypertension): None.   ?Hypertension (systolic ?160 mmHg or diastolic ?100 mmHg): None.   ?Venous thromboembolism: None.  ?Known thrombogenic mutation: None.   ?Known ischemic heart disease: None.  ?History of stroke: None.   ?Complicated valvular heart disease (pulmonary hypertension, risk for atrial fibrillation, history of subacute bacterial endocarditis): None.   ?Current breast cancer: None.   ?Severe (decompensated) cirrhosis: None.    ?Hepatocellular adenoma or malignant hepatoma: None.   ?Migraine with aura: None.   ?Diabetes mellitus of >20 years duration or with nephropathy, retinopathy, or neuropathy: None.     ROS  See HPI.     No Known Allergies    Current medicines (including changes today)  No current outpatient medications on file.     No current facility-administered medications for this visit.     She  has no past medical history of Ulcer.  She  has no past surgical history on file.  Social History     Tobacco Use   • Smoking status: Never Smoker   • Smokeless tobacco:  "Never Used   Substance Use Topics   • Alcohol use: No     Alcohol/week: 0.0 oz     Comment: occ   • Drug use: No       Family History   Problem Relation Age of Onset   • Hypertension Father    • Cancer Paternal Grandmother         ?   • Hypertension Mother    • Hypertension Maternal Grandmother    • Diabetes Neg Hx    • Lung Disease Neg Hx    • Heart Disease Neg Hx    • Hyperlipidemia Neg Hx      Family Status   Relation Name Status   • Fa  Alive   • PGMo  (Not Specified)   • Mo  Alive       Patient Active Problem List    Diagnosis Date Noted   • Encounter for initial prescription of other contraceptives 03/24/2021        Objective:     /66 (BP Location: Left arm, Patient Position: Sitting, BP Cuff Size: Adult)   Pulse 100   Temp 36.1 °C (97 °F)   Resp 16   Ht 1.575 m (5' 2\")   Wt 64.4 kg (142 lb)   SpO2 96%  Body mass index is 25.97 kg/m².    Physical Exam:  Physical Exam   Constitutional: She is oriented to person, place, and time and well-developed, well-nourished, and in no distress.   HENT:   Head: Normocephalic.   Right Ear: External ear normal.   Left Ear: External ear normal.   Eyes: Pupils are equal, round, and reactive to light.   Neck: No thyromegaly present.   Cardiovascular: Normal rate, regular rhythm and normal heart sounds.   Pulmonary/Chest: Effort normal and breath sounds normal.   Musculoskeletal:         General: Normal range of motion.      Cervical back: Normal range of motion.   Lymphadenopathy:     She has no cervical adenopathy.        Right: No supraclavicular adenopathy present.        Left: No supraclavicular adenopathy present.   Neurological: She is alert and oriented to person, place, and time. Gait normal.   Skin: Skin is warm and dry.   Psychiatric: Affect and judgment normal.   Vitals reviewed.    Assessment and Plan:     The following treatment plan was discussed:    1. Encounter for initial prescription of other contraceptives  - Her compilation of symptoms are likely " due to the Mirena IUD.   - Plan: Follow up with OBGYN as scheduled for further evaluation regarding new found ovarian cyst and IUD removal with switch to OCP's. She has no contraindications to being started back on OCP's.      Any change or worsening of signs or symptoms, patient encouraged to follow-up or report to emergency room for further evaluation. Patient verbalizes understanding and agrees.    Follow-Up: Return if symptoms worsen or fail to improve.      PLEASE NOTE: This dictation was created using voice recognition software. I have made every reasonable attempt to correct obvious errors, but I expect that there are errors of grammar and possibly content that I did not discover before finalizing the note.

## 2021-04-10 ENCOUNTER — IMMUNIZATION (OUTPATIENT)
Dept: FAMILY PLANNING/WOMEN'S HEALTH CLINIC | Facility: IMMUNIZATION CENTER | Age: 21
End: 2021-04-10
Attending: INTERNAL MEDICINE
Payer: MEDICAID

## 2021-04-10 DIAGNOSIS — Z23 ENCOUNTER FOR VACCINATION: Primary | ICD-10-CM

## 2021-04-10 PROCEDURE — 91301 MODERNA SARS-COV-2 VACCINE: CPT | Performed by: NURSE PRACTITIONER

## 2021-04-10 PROCEDURE — 0012A MODERNA SARS-COV-2 VACCINE: CPT | Performed by: NURSE PRACTITIONER

## 2021-05-19 ENCOUNTER — OFFICE VISIT (OUTPATIENT)
Dept: MEDICAL GROUP | Facility: MEDICAL CENTER | Age: 21
End: 2021-05-19
Attending: PHYSICIAN ASSISTANT
Payer: MEDICAID

## 2021-05-19 VITALS
BODY MASS INDEX: 26.68 KG/M2 | HEART RATE: 99 BPM | OXYGEN SATURATION: 96 % | DIASTOLIC BLOOD PRESSURE: 68 MMHG | HEIGHT: 62 IN | SYSTOLIC BLOOD PRESSURE: 100 MMHG | WEIGHT: 145 LBS | RESPIRATION RATE: 18 BRPM | TEMPERATURE: 98.8 F

## 2021-05-19 DIAGNOSIS — Z11.3 SCREENING EXAMINATION FOR SEXUALLY TRANSMITTED DISEASE: ICD-10-CM

## 2021-05-19 PROCEDURE — 99212 OFFICE O/P EST SF 10 MIN: CPT | Performed by: PHYSICIAN ASSISTANT

## 2021-05-19 PROCEDURE — 99213 OFFICE O/P EST LOW 20 MIN: CPT | Performed by: PHYSICIAN ASSISTANT

## 2021-05-19 ASSESSMENT — FIBROSIS 4 INDEX: FIB4 SCORE: 0.53

## 2021-05-19 NOTE — ASSESSMENT & PLAN NOTE
Joann presents today for follow up. She reports that she had unprotected sex 1 day ago. She presents today requesting STI testing. She is currently symptom free.

## 2021-05-19 NOTE — PROGRESS NOTES
"Chief Complaint   Patient presents with   • Other     std     Subjective:     HPI:   Joann Medley is a 20 y.o. female here to discuss the evaluation and management of:    Screening examination for sexually transmitted disease  Joann presents today for follow up. She reports that she had unprotected sex 1 day ago. She presents today requesting STI testing. She is currently symptom free.     ROS  See HPI.     No Known Allergies    Current medicines (including changes today)  No current outpatient medications on file.     No current facility-administered medications for this visit.       Social History     Tobacco Use   • Smoking status: Never Smoker   • Smokeless tobacco: Never Used   Vaping Use   • Vaping Use: Never used   Substance Use Topics   • Alcohol use: No     Alcohol/week: 0.0 oz     Comment: occ   • Drug use: No       Patient Active Problem List    Diagnosis Date Noted   • Screening examination for sexually transmitted disease 03/24/2021       Family History   Problem Relation Age of Onset   • Hypertension Father    • Cancer Paternal Grandmother         ?   • Hypertension Mother    • Hypertension Maternal Grandmother    • Diabetes Neg Hx    • Lung Disease Neg Hx    • Heart Disease Neg Hx    • Hyperlipidemia Neg Hx         Objective:     /68 (BP Location: Left arm, Patient Position: Sitting, BP Cuff Size: Adult)   Pulse 99   Temp 37.1 °C (98.8 °F) (Temporal)   Resp 18   Ht 1.575 m (5' 2\")   Wt 65.8 kg (145 lb)   SpO2 96%  Body mass index is 26.52 kg/m².    Physical Exam:  Physical Exam  Vitals reviewed.   Constitutional:       Appearance: Normal appearance.   HENT:      Head: Normocephalic.   Cardiovascular:      Rate and Rhythm: Normal rate and regular rhythm.      Heart sounds: Normal heart sounds.   Pulmonary:      Effort: Pulmonary effort is normal.      Breath sounds: Normal breath sounds.   Neurological:      Mental Status: She is alert and oriented to person, place, and " time.      Gait: Gait is intact.   Psychiatric:         Mood and Affect: Affect normal.       Assessment and Plan:     The following treatment plan was discussed:    1. Screening examination for sexually transmitted disease  - I will notify the patient via [facility] once I have received and reviewed her lab results.  - CHLAMYDIA/GC PCR URINE OR SWAB; Future  - HEPATITIS PANEL ACUTE(4 COMPONENTS); Future  - T.PALLIDUM AB EIA; Future  - VAGINAL PATHOGENS DNA PANEL; Future  - HIV AG/AB COMBO ASSAY SCREENING; Future     Any change or worsening of signs or symptoms, patient encouraged to follow-up or report to emergency room for further evaluation. Patient verbalizes understanding and agrees.    Follow-Up: Return if symptoms worsen or fail to improve.      PLEASE NOTE: This dictation was created using voice recognition software. I have made every reasonable attempt to correct obvious errors, but I expect that there are errors of grammar and possibly content that I did not discover before finalizing the note.

## 2021-05-20 ENCOUNTER — HOSPITAL ENCOUNTER (OUTPATIENT)
Dept: LAB | Facility: MEDICAL CENTER | Age: 21
End: 2021-05-20
Attending: PHYSICIAN ASSISTANT
Payer: MEDICAID

## 2021-05-20 DIAGNOSIS — Z11.3 SCREENING EXAMINATION FOR SEXUALLY TRANSMITTED DISEASE: ICD-10-CM

## 2021-05-20 LAB
HAV IGM SERPL QL IA: NORMAL
HBV CORE IGM SER QL: NORMAL
HBV SURFACE AG SER QL: NORMAL
HCV AB SER QL: NORMAL
HIV 1+2 AB+HIV1 P24 AG SERPL QL IA: NORMAL
TREPONEMA PALLIDUM IGG+IGM AB [PRESENCE] IN SERUM OR PLASMA BY IMMUNOASSAY: NORMAL

## 2021-05-20 PROCEDURE — 86780 TREPONEMA PALLIDUM: CPT

## 2021-05-20 PROCEDURE — 87389 HIV-1 AG W/HIV-1&-2 AB AG IA: CPT

## 2021-05-20 PROCEDURE — 87591 N.GONORRHOEAE DNA AMP PROB: CPT

## 2021-05-20 PROCEDURE — 36415 COLL VENOUS BLD VENIPUNCTURE: CPT

## 2021-05-20 PROCEDURE — 80074 ACUTE HEPATITIS PANEL: CPT

## 2021-05-20 PROCEDURE — 87491 CHLMYD TRACH DNA AMP PROBE: CPT

## 2021-09-22 ENCOUNTER — HOSPITAL ENCOUNTER (EMERGENCY)
Facility: MEDICAL CENTER | Age: 21
End: 2021-09-22
Attending: EMERGENCY MEDICINE
Payer: MEDICAID

## 2021-09-22 VITALS
OXYGEN SATURATION: 98 % | SYSTOLIC BLOOD PRESSURE: 121 MMHG | WEIGHT: 145.28 LBS | DIASTOLIC BLOOD PRESSURE: 80 MMHG | TEMPERATURE: 98 F | RESPIRATION RATE: 18 BRPM | HEIGHT: 62 IN | BODY MASS INDEX: 26.74 KG/M2 | HEART RATE: 90 BPM

## 2021-09-22 DIAGNOSIS — H72.91 PERFORATED EAR DRUM, RIGHT: ICD-10-CM

## 2021-09-22 PROCEDURE — 99282 EMERGENCY DEPT VISIT SF MDM: CPT

## 2021-09-22 ASSESSMENT — FIBROSIS 4 INDEX: FIB4 SCORE: 0.56

## 2021-09-22 NOTE — ED PROVIDER NOTES
"ED Provider Note    CHIEF COMPLAINT  Chief Complaint   Patient presents with   • Ear Pain     Reports \"the tip of a comb went into my ear and when it came out I couldn't hear\"- R ear. Denies drainage from R ear.       MONTRELL Medley is a 21 y.o. female who presents to the Emergency Department with her toddler daughter.  The daughter pushed the tip of a sharp comb into her ear, since then she has had drainage and decreased hearing from that ear.  She denies any fevers headaches vomiting or other acute symptoms    REVIEW OF SYSTEMS  As above, all other systems negative.  PAST MEDICAL HISTORY   has a past medical history of Patient denies medical problems.    SOCIAL HISTORY  Social History     Tobacco Use   • Smoking status: Never Smoker   • Smokeless tobacco: Never Used   Vaping Use   • Vaping Use: Never used   Substance and Sexual Activity   • Alcohol use: No     Alcohol/week: 0.0 oz     Comment: occ   • Drug use: No   • Sexual activity: Yes     Partners: Male     Birth control/protection: Pill     Comment: Unplanned pregnanacy. Pt states was pregnant while on the pill       SURGICAL HISTORY  patient denies any surgical history    CURRENT MEDICATIONS  Reviewed.  See Encounter Summary.    ALLERGIES  No Known Allergies    PHYSICAL EXAM  VITAL SIGNS: /80   Pulse 91   Temp 36.6 °C (97.9 °F) (Temporal)   Resp 16   Ht 1.575 m (5' 2\")   Wt 65.9 kg (145 lb 4.5 oz)   SpO2 97%   BMI 26.57 kg/m²   Constitutional: Pleasant alert in no apparent distress.  HENT: Normocephalic, Bilateral external ears normal. Nose normal.   Eyes: Pupils are equal and reactive. Conjunctiva normal, non-icteric.  Small puncture hole noted in right tympanic membrane with some old blood no active bleeding no active drainage  Thorax & Lungs: Easy unlabored respirations  Abdomen:  No gross signs of peritonitis, no pain with movement   Skin: Visualized skin is  Dry, No erythema, No rash.   Extremities:   No edema, No " asymmetry  Neurologic: Alert, Grossly non-focal.   Psychiatric: Affect and Mood normal      COURSE & MEDICAL DECISION MAKING  Nursing notes and vital signs were reviewed. (See chart for details)    The patient presents to the Emergency Department with chief complaint of right ear pain.  On exam the patient has a perforated tympanic membrane, it does appear to be from a sharp object it is quite small.  I recommended at this time she avoid submersion underwater, she should return if she develops pain or fever or purulent appearing drainage.  Otherwise I have given her the number of ENT if they will see her directly from [facility] ER she can go directly to them otherwise she will need to get a hold of her primary care for an ENT referral to make sure that the perforation has healed      The patient was discharged home with an information sheet on perforated tympanic membrane and told to return immediately for any signs or symptoms listed, or any unexpected symptoms.  The patient verbally agreed to the discharge precautions and follow-up plan which is documented in EPIC.  DISPOSITION:    Patient will be discharged home in stable condition.    FOLLOW UP:  Sophie Krishnan P.A.-C.  [address] [facility] [id number]  [phone]          Allen Medellin M.D.  [address] [facility] [id number] [phone]            OUTPATIENT MEDICATIONS:  New Prescriptions    No medications on file         FINAL IMPRESSION   1. Perforated ear drum, right

## 2022-02-11 ENCOUNTER — OFFICE VISIT (OUTPATIENT)
Dept: URGENT CARE | Facility: CLINIC | Age: 22
End: 2022-02-11
Payer: COMMERCIAL

## 2022-02-11 VITALS
BODY MASS INDEX: 26.57 KG/M2 | DIASTOLIC BLOOD PRESSURE: 72 MMHG | SYSTOLIC BLOOD PRESSURE: 118 MMHG | HEIGHT: 62 IN | TEMPERATURE: 98.3 F | OXYGEN SATURATION: 98 % | HEART RATE: 100 BPM | WEIGHT: 144.4 LBS | RESPIRATION RATE: 18 BRPM

## 2022-02-11 DIAGNOSIS — N93.8 DYSFUNCTIONAL UTERINE BLEEDING: ICD-10-CM

## 2022-02-11 LAB
APPEARANCE UR: CLEAR
BILIRUB UR STRIP-MCNC: NEGATIVE MG/DL
COLOR UR AUTO: YELLOW
GLUCOSE UR STRIP.AUTO-MCNC: NEGATIVE MG/DL
INT CON NEG: NEGATIVE
INT CON POS: POSITIVE
KETONES UR STRIP.AUTO-MCNC: NORMAL MG/DL
LEUKOCYTE ESTERASE UR QL STRIP.AUTO: NEGATIVE
NITRITE UR QL STRIP.AUTO: NEGATIVE
PH UR STRIP.AUTO: 6.5 [PH] (ref 5–8)
POC URINE PREGNANCY TEST: NORMAL
PROT UR QL STRIP: NORMAL MG/DL
RBC UR QL AUTO: NORMAL
SP GR UR STRIP.AUTO: 1.03
UROBILINOGEN UR STRIP-MCNC: 20 MG/DL

## 2022-02-11 PROCEDURE — 81025 URINE PREGNANCY TEST: CPT | Performed by: PHYSICIAN ASSISTANT

## 2022-02-11 PROCEDURE — 81002 URINALYSIS NONAUTO W/O SCOPE: CPT | Performed by: PHYSICIAN ASSISTANT

## 2022-02-11 PROCEDURE — 99212 OFFICE O/P EST SF 10 MIN: CPT | Performed by: PHYSICIAN ASSISTANT

## 2022-02-12 ENCOUNTER — HOSPITAL ENCOUNTER (EMERGENCY)
Facility: MEDICAL CENTER | Age: 22
End: 2022-02-13
Attending: EMERGENCY MEDICINE
Payer: COMMERCIAL

## 2022-02-12 ENCOUNTER — APPOINTMENT (OUTPATIENT)
Dept: RADIOLOGY | Facility: MEDICAL CENTER | Age: 22
End: 2022-02-12
Attending: EMERGENCY MEDICINE
Payer: COMMERCIAL

## 2022-02-12 VITALS
SYSTOLIC BLOOD PRESSURE: 124 MMHG | RESPIRATION RATE: 18 BRPM | DIASTOLIC BLOOD PRESSURE: 82 MMHG | OXYGEN SATURATION: 100 % | TEMPERATURE: 97.7 F | BODY MASS INDEX: 26.9 KG/M2 | WEIGHT: 146.16 LBS | HEART RATE: 83 BPM | HEIGHT: 62 IN

## 2022-02-12 DIAGNOSIS — O20.0 THREATENED MISCARRIAGE IN EARLY PREGNANCY: ICD-10-CM

## 2022-02-12 LAB
ALBUMIN SERPL BCP-MCNC: 4.2 G/DL (ref 3.2–4.9)
ALBUMIN/GLOB SERPL: 1.3 G/DL
ALP SERPL-CCNC: 48 U/L (ref 30–99)
ALT SERPL-CCNC: 9 U/L (ref 2–50)
ANION GAP SERPL CALC-SCNC: 11 MMOL/L (ref 7–16)
APPEARANCE UR: CLEAR
AST SERPL-CCNC: 16 U/L (ref 12–45)
B-HCG SERPL-ACNC: 68.2 MIU/ML (ref 0–10)
BASOPHILS # BLD AUTO: 1.2 % (ref 0–1.8)
BASOPHILS # BLD: 0.04 K/UL (ref 0–0.12)
BILIRUB SERPL-MCNC: 0.3 MG/DL (ref 0.1–1.5)
BILIRUB UR QL STRIP.AUTO: NEGATIVE
BUN SERPL-MCNC: 14 MG/DL (ref 8–22)
CALCIUM SERPL-MCNC: 9 MG/DL (ref 8.4–10.2)
CHLORIDE SERPL-SCNC: 106 MMOL/L (ref 96–112)
CO2 SERPL-SCNC: 23 MMOL/L (ref 20–33)
COLOR UR: YELLOW
CREAT SERPL-MCNC: 0.88 MG/DL (ref 0.5–1.4)
EOSINOPHIL # BLD AUTO: 0.04 K/UL (ref 0–0.51)
EOSINOPHIL NFR BLD: 1.2 % (ref 0–6.9)
ERYTHROCYTE [DISTWIDTH] IN BLOOD BY AUTOMATED COUNT: 43.6 FL (ref 35.9–50)
GLOBULIN SER CALC-MCNC: 3.2 G/DL (ref 1.9–3.5)
GLUCOSE SERPL-MCNC: 76 MG/DL (ref 65–99)
GLUCOSE UR STRIP.AUTO-MCNC: NEGATIVE MG/DL
HCT VFR BLD AUTO: 37.3 % (ref 37–47)
HGB BLD-MCNC: 11.5 G/DL (ref 12–16)
IMM GRANULOCYTES # BLD AUTO: 0 K/UL (ref 0–0.11)
IMM GRANULOCYTES NFR BLD AUTO: 0 % (ref 0–0.9)
KETONES UR STRIP.AUTO-MCNC: NEGATIVE MG/DL
LEUKOCYTE ESTERASE UR QL STRIP.AUTO: NEGATIVE
LYMPHOCYTES # BLD AUTO: 1.95 K/UL (ref 1–4.8)
LYMPHOCYTES NFR BLD: 58.2 % (ref 22–41)
MCH RBC QN AUTO: 26.4 PG (ref 27–33)
MCHC RBC AUTO-ENTMCNC: 30.8 G/DL (ref 33.6–35)
MCV RBC AUTO: 85.6 FL (ref 81.4–97.8)
MICRO URNS: NORMAL
MONOCYTES # BLD AUTO: 0.25 K/UL (ref 0–0.85)
MONOCYTES NFR BLD AUTO: 7.5 % (ref 0–13.4)
NEUTROPHILS # BLD AUTO: 1.07 K/UL (ref 2–7.15)
NEUTROPHILS NFR BLD: 31.9 % (ref 44–72)
NITRITE UR QL STRIP.AUTO: NEGATIVE
NRBC # BLD AUTO: 0 K/UL
NRBC BLD-RTO: 0 /100 WBC
PH UR STRIP.AUTO: 5.5 [PH] (ref 5–8)
PLATELET # BLD AUTO: 237 K/UL (ref 164–446)
PMV BLD AUTO: 11.3 FL (ref 9–12.9)
POTASSIUM SERPL-SCNC: 3.4 MMOL/L (ref 3.6–5.5)
PROT SERPL-MCNC: 7.4 G/DL (ref 6–8.2)
PROT UR QL STRIP: NEGATIVE MG/DL
RBC # BLD AUTO: 4.36 M/UL (ref 4.2–5.4)
RBC UR QL AUTO: NEGATIVE
SODIUM SERPL-SCNC: 140 MMOL/L (ref 135–145)
SP GR UR STRIP.AUTO: >=1.03
WBC # BLD AUTO: 3.4 K/UL (ref 4.8–10.8)

## 2022-02-12 PROCEDURE — 81003 URINALYSIS AUTO W/O SCOPE: CPT

## 2022-02-12 PROCEDURE — 84702 CHORIONIC GONADOTROPIN TEST: CPT

## 2022-02-12 PROCEDURE — 86901 BLOOD TYPING SEROLOGIC RH(D): CPT

## 2022-02-12 PROCEDURE — 76801 OB US < 14 WKS SINGLE FETUS: CPT

## 2022-02-12 PROCEDURE — 80053 COMPREHEN METABOLIC PANEL: CPT

## 2022-02-12 PROCEDURE — 85025 COMPLETE CBC W/AUTO DIFF WBC: CPT

## 2022-02-12 ASSESSMENT — PAIN DESCRIPTION - PAIN TYPE: TYPE: ACUTE PAIN

## 2022-02-12 NOTE — PROGRESS NOTES
"Subjective:   Joann Medley is a 21 y.o. female who presents for Menstrual Problem (lower back/abdominal cramping/x5 days)        Patient presents with chief complaint of abnormal menstrual cycle.  She reports her last normal menstrual period began on January 6.  She anticipated another normal menstrual cycle but has not had one.  She had a slight rush of blood and then transition to some brownish type blood.  Her.  Then began again and then stopped.  She had a similar episode a couple of years ago.  She did have an IUD but this has been removed in 2020.  She is not currently on birth control.  She is sexually active in a monogamous relationship.  She denies discharge, burning, pain, itching.  She specifically denies abdominal pain, fever, chills, lightheadedness or back pain.        ROS    Medications:    • This patient does not have an active medication from one of the medication groupers.    Allergies: Patient has no known allergies.    Problem List: Joann Medley does not have any pertinent problems on file.    Surgical History:  No past surgical history on file.    Past Social Hx: Joann Medley  reports that she has never smoked. She has never used smokeless tobacco. She reports that she does not drink alcohol and does not use drugs.     Past Family Hx:  Joann Medley family history includes Cancer in her paternal grandmother; Hypertension in her father, maternal grandmother, and mother.       Problem list, medications, and allergies reviewed by myself today in Epic.     Objective:     /72 (BP Location: Left arm, Patient Position: Sitting)   Pulse 100   Temp 36.8 °C (98.3 °F) (Temporal)   Resp 18   Ht 1.575 m (5' 2\")   Wt 65.5 kg (144 lb 6.4 oz)   SpO2 98%   BMI 26.41 kg/m²     Physical Exam  Vitals and nursing note reviewed.   Constitutional:       General: She is not in acute distress.  Abdominal:      General: Abdomen is flat. There is no " distension.      Palpations: Abdomen is soft. There is no mass.      Tenderness: There is no guarding or rebound.      Comments: There is no suprapubic tenderness.   Neurological:      Mental Status: She is alert.         Urine pregnancy test is negative today in the office.  She has nitrite and leukocyte esterase negative.  There is moderate blood present.  Assessment/Plan:     Diagnosis and associated orders:     1. Dysfunctional uterine bleeding  Referral to Gynecology      Comments/MDM:     • Patient seen and examined.  Differential diagnosis discussed.  Her vital signs are stable.  She has no significant abdominal pain.  Her urine hCG is negative.  Her abdominal exam is unremarkable.  • We discussed risks for ectopic pregnancy and indications to return to the ED specifically abdominal pain, nausea, vomiting, bleeding  • She defers STI testing  • I will place referral to gynecology for further work-up of dysfunctional uterine bleeding possible ovulatory dysfunction.  She has had episodes similar to this in the past.  • She will follow up with her PCP.  • She is not currently bleeding and has normal vital signs.        I personally reviewed prior external notes and test results pertinent to today's visit.  Red flags discussed as well as indications to present to the Emergency Department.  Supportive care, natural history, differential diagnoses, and indications for immediate follow-up discussed.  Patient expresses understanding and agrees to plan.  Patient denies any other questions or concerns.    Follow-up with the primary care physician for recheck, reevaluation, and consideration of further management.      Please note that this dictation was created using voice recognition software. I have made a reasonable attempt to correct obvious errors, but I expect that there are errors of grammar and possibly content that I did not discover before finalizing the note.    This note was electronically signed by Jennifer  ODALYS Pittman

## 2022-02-13 LAB
NUMBER OF RH DOSES IND 8505RD: NORMAL
RH BLD: NORMAL

## 2022-02-13 PROCEDURE — 94760 N-INVAS EAR/PLS OXIMETRY 1: CPT

## 2022-02-13 PROCEDURE — 99284 EMERGENCY DEPT VISIT MOD MDM: CPT

## 2022-02-13 NOTE — DISCHARGE INSTRUCTIONS
"Radiologist recommends return in 48 hours for recheck of your pregnancy hormone level (\"beta hCG\"); at this point it is unclear if you are having an early miscarriage or are simply very early in her pregnancy.  "

## 2022-02-13 NOTE — ED PROVIDER NOTES
ED Provider Note    ED Provider Note    Scribed for Jessica Coronado MD by Jessica Coronado M.D.. 2022, 10:56 PM.    Primary care provider: Sophie Rojas P.A.-C.  Means of arrival: Private  History obtained from: Patient  History limited by: None    CHIEF COMPLAINT  Chief Complaint   Patient presents with   • Other     is having irregular periods started 2022  is spottng on and off   was seen at  yesterday  told possible ectopic preg however preg test was neg         HPI  Joann Medley is a 21 y.o. female who presents to the Emergency Department for evaluation of irregular vaginal bleeding.  Patient notes that she had her IUD removed in  she had very regular periods, noting usually every 30 days lasting about 5 to 6 days.  She relates this month she did not get her period until about the eighth, but she relates irregular waxing and waning bright red and occasionally dark brown vaginal bleeding with pelvic cramping radiating to the back since.  Patient saw urgent care and had a negative hCG yesterday.  She was told to come for further evaluation.  She is a , has a 2-1/2-year-old daughter.  Currently on no medications.  No vaginal discharge, no vaginal pruritus, no dysuria, no fever.  She notes the cramping in her pelvis and back are similar to when she is on her period.  She is not feeling lightheaded and has had no syncope, she is not anticoagulated or on antiplatelet therapy.    REVIEW OF SYSTEMS  Pertinent positives include irregular vaginal bleeding with pelvic cramping radiating to the back. Pertinent negatives include no known pregnancy, no fever, no vomiting, no dysuria, no vaginal discharge.  All other systems reviewed and negative.    PAST MEDICAL HISTORY   has a past medical history of Patient denies medical problems.    SURGICAL HISTORY  patient denies any surgical history    SOCIAL HISTORY  Social History     Tobacco Use   • Smoking status: Never Smoker   •  "Smokeless tobacco: Never Used   Vaping Use   • Vaping Use: Never used   Substance Use Topics   • Alcohol use: No     Alcohol/week: 0.0 oz     Comment: occ   • Drug use: No      Social History     Substance and Sexual Activity   Drug Use No       FAMILY HISTORY  Family History   Problem Relation Age of Onset   • Hypertension Father    • Cancer Paternal Grandmother         ?   • Hypertension Mother    • Hypertension Maternal Grandmother    • Diabetes Neg Hx    • Lung Disease Neg Hx    • Heart Disease Neg Hx    • Hyperlipidemia Neg Hx        CURRENT MEDICATIONS  Home Medications     Reviewed by Gaye Schneider R.N. (Registered Nurse) on 02/12/22 at 2103  Med List Status: <None>   Medication Last Dose Status        Patient Prakash Taking any Medications                       ALLERGIES  No Known Allergies    PHYSICAL EXAM  VITAL SIGNS: /82   Pulse 83   Temp 36.5 °C (97.7 °F) (Temporal)   Resp 18   Ht 1.575 m (5' 2\")   Wt 66.3 kg (146 lb 2.6 oz)   LMP 02/08/2022   SpO2 100%   BMI 26.73 kg/m²     General: Alert, no acute distress  Skin: Warm, dry, normal for ethnicity  Head: Normocephalic, atraumatic  Neck: Trachea midline, no tenderness  Eye: PERRL, normal conjunctiva without pallor  ENMT: Oral mucosa moist, no pharyngeal erythema or exudate  Cardiovascular: Regular rate and rhythm, No murmur, Normal peripheral perfusion  Respiratory: Lungs CTA, respirations are non-labored, breath sounds are equal  Gastrointestinal: Soft, nontender, non distended.  No CVA tenderness.  Musculoskeletal: No swelling, no deformity  Neurological: Alert and oriented to person, place, time, and situation  Lymphatics: No lymphadenopathy  Psychiatric: Cooperative, appropriate mood & affect      DIAGNOSTIC STUDIES/PROCEDURES    LABS  Results for orders placed or performed during the hospital encounter of 02/12/22   CBC WITH DIFFERENTIAL   Result Value Ref Range    WBC 3.4 (L) 4.8 - 10.8 K/uL    RBC 4.36 4.20 - 5.40 M/uL    " Hemoglobin 11.5 (L) 12.0 - 16.0 g/dL    Hematocrit 37.3 37.0 - 47.0 %    MCV 85.6 81.4 - 97.8 fL    MCH 26.4 (L) 27.0 - 33.0 pg    MCHC 30.8 (L) 33.6 - 35.0 g/dL    RDW 43.6 35.9 - 50.0 fL    Platelet Count 237 164 - 446 K/uL    MPV 11.3 9.0 - 12.9 fL    Neutrophils-Polys 31.90 (L) 44.00 - 72.00 %    Lymphocytes 58.20 (H) 22.00 - 41.00 %    Monocytes 7.50 0.00 - 13.40 %    Eosinophils 1.20 0.00 - 6.90 %    Basophils 1.20 0.00 - 1.80 %    Immature Granulocytes 0.00 0.00 - 0.90 %    Nucleated RBC 0.00 /100 WBC    Neutrophils (Absolute) 1.07 (L) 2.00 - 7.15 K/uL    Lymphs (Absolute) 1.95 1.00 - 4.80 K/uL    Monos (Absolute) 0.25 0.00 - 0.85 K/uL    Eos (Absolute) 0.04 0.00 - 0.51 K/uL    Baso (Absolute) 0.04 0.00 - 0.12 K/uL    Immature Granulocytes (abs) 0.00 0.00 - 0.11 K/uL    NRBC (Absolute) 0.00 K/uL   COMP METABOLIC PANEL   Result Value Ref Range    Sodium 140 135 - 145 mmol/L    Potassium 3.4 (L) 3.6 - 5.5 mmol/L    Chloride 106 96 - 112 mmol/L    Co2 23 20 - 33 mmol/L    Anion Gap 11.0 7.0 - 16.0    Glucose 76 65 - 99 mg/dL    Bun 14 8 - 22 mg/dL    Creatinine 0.88 0.50 - 1.40 mg/dL    Calcium 9.0 8.4 - 10.2 mg/dL    AST(SGOT) 16 12 - 45 U/L    ALT(SGPT) 9 2 - 50 U/L    Alkaline Phosphatase 48 30 - 99 U/L    Total Bilirubin 0.3 0.1 - 1.5 mg/dL    Albumin 4.2 3.2 - 4.9 g/dL    Total Protein 7.4 6.0 - 8.2 g/dL    Globulin 3.2 1.9 - 3.5 g/dL    A-G Ratio 1.3 g/dL   URINALYSIS (UA)    Specimen: Urine   Result Value Ref Range    Color Yellow     Character Clear     Specific Gravity >=1.030 <1.035    Ph 5.5 5.0 - 8.0    Glucose Negative Negative mg/dL    Ketones Negative Negative mg/dL    Protein Negative Negative mg/dL    Bilirubin Negative Negative    Nitrite Negative Negative    Leukocyte Esterase Negative Negative    Occult Blood Negative Negative    Micro Urine Req see below    HCG QUANTITATIVE SERUM   Result Value Ref Range    Bhcg 68.2 (H) 0.0 - 10.0 mIU/mL   RH TYPE FOR RHOGAM FROM E.D.   Result Value Ref  "Range    Emergency Department Rh Typing POS     Number Of Rh Doses Indicated ZERO    ESTIMATED GFR   Result Value Ref Range    GFR If African American >60 >60 mL/min/1.73 m 2    GFR If Non African American >60 >60 mL/min/1.73 m 2     All labs reviewed by me, minimally elevated BNP, otherwise unremarkable    RADIOLOGY  US-OB 1ST TRIMESTER WITH TRANSVAGINAL (COMBO)   Final Result         An intrauterine gestational sac is not expected to be visualized with a beta-hCG of this level. Differential considerations include early pregnancy or spontaneous . Ectopic pregnancy cannot be excluded.      Continued follow-up imaging and serial beta-hCG is recommended.        The radiologist's interpretation of all radiological studies have been reviewed by me.    COURSE & MEDICAL DECISION MAKING  Pertinent Labs & Imaging studies reviewed. (See chart for details)    10:56 PM - Patient seen and examined at bedside. Patient declines analgesia at this time. Ordered metabolic work-up as well as serum hCG and pelvic ultrasound to evaluate her symptoms. The differential diagnoses include but are not limited to: Ovarian cyst, dysfunctional uterine bleeding,     0001: Patient reassessed, she remains in no acute distress.  I have updated her with work-up results.      Patient Vitals for the past 24 hrs:   BP Temp Temp src Pulse Resp SpO2 Height Weight   22 2101 124/82 36.5 °C (97.7 °F) Temporal 83 18 100 % -- --   22 -- -- -- -- -- -- 1.575 m (5' 2\") 66.3 kg (146 lb 2.6 oz)         Decision Making:  This is a 21 y.o. year old female who presents with abnormal vaginal bleeding since the eighth.  Last normal period would have been early January.  Negative urine hCG when seen at the urgent care, she is coming to our facility for further evaluation.  Patient's beta quant is minimally elevated, consistent with about 2 to 3 weeks gestation.  Pelvic ultrasound does not demonstrate ectopic pregnancy but does also demonstrate " no evidence of any uterine pregnancy.  He is otherwise well-appearing, she notes her discomfort is similar to that which she is had with menstrual periods in the past.  No fever, no leukocytosis, no significant drop in hemoglobin/hematocrit.  No hypotension and no tachycardia.   I suspect either spontaneous  versus perhaps early pregnancy.  Though unlikely and noted patient ectopic pregnancy cannot be entirely ruled out.  I would like her to return in 48 hours or see her obstetrician in 48 hours for recheck of beta hCG to further elucidate this presentation.  History and physical and work-up thankfully otherwise reassuring and there is no indication at this point for inpatient management at this time.     The patient will return for new or worsening symptoms and is stable at the time of discharge.      DISPOSITION:  Patient will be discharged home in stable condition.    FOLLOW UP:  Sophie Rojas P.A.-C.  [address] [facility] [address] [phone]    Schedule an appointment as soon as possible for a visit       [facility]  [address]  [facility] [address] [phone]  Schedule an appointment as soon as possible for a visit         OUTPATIENT MEDICATIONS:  There are no discharge medications for this patient.          FINAL IMPRESSION  1. Threatened miscarriage in early pregnancy          Jessica QUARLES M.D. (Jaxson), am scribing for, and in the presence of, Jessica Coronado MD.    Electronically signed by: Jessica Coronado M.D. (Jaxson), 2022    IJessica MD personally performed the services described in this documentation, as scribed by Jessica Coronado M.D. in my presence, and it is both accurate and complete    The note accurately reflects work and decisions made by me.  Jessica Coronado M.D.  2022  12:57 AM

## 2022-02-13 NOTE — ED TRIAGE NOTES
Pt comes in c/o irregular period that started on 2/8/2022  Is having brownish dark dischg which is unusual for pt  No pain  Was seen at [facility] yesterday and was told that pregnacy test was negative however inconclusive for possible ectopic pregnancy   This was not ruled out   Told to seek further evaluation if concerns continued

## 2022-05-10 ENCOUNTER — HOSPITAL ENCOUNTER (OUTPATIENT)
Facility: MEDICAL CENTER | Age: 22
End: 2022-05-10
Attending: PHYSICIAN ASSISTANT
Payer: COMMERCIAL

## 2022-05-10 ENCOUNTER — OFFICE VISIT (OUTPATIENT)
Dept: URGENT CARE | Facility: CLINIC | Age: 22
End: 2022-05-10
Payer: COMMERCIAL

## 2022-05-10 VITALS
DIASTOLIC BLOOD PRESSURE: 62 MMHG | HEIGHT: 62 IN | SYSTOLIC BLOOD PRESSURE: 110 MMHG | WEIGHT: 146 LBS | TEMPERATURE: 98.5 F | OXYGEN SATURATION: 98 % | BODY MASS INDEX: 26.87 KG/M2 | RESPIRATION RATE: 18 BRPM | HEART RATE: 115 BPM

## 2022-05-10 DIAGNOSIS — Z3A.01 LESS THAN 8 WEEKS GESTATION OF PREGNANCY: ICD-10-CM

## 2022-05-10 DIAGNOSIS — B96.89 GARDNERELLA VAGINALIS INFECTION: ICD-10-CM

## 2022-05-10 DIAGNOSIS — N89.8 VAGINAL DISCHARGE: ICD-10-CM

## 2022-05-10 DIAGNOSIS — B37.31 VAGINAL CANDIDIASIS: ICD-10-CM

## 2022-05-10 DIAGNOSIS — N76.0 GARDNERELLA VAGINALIS INFECTION: ICD-10-CM

## 2022-05-10 LAB
APPEARANCE UR: NORMAL
BILIRUB UR STRIP-MCNC: NEGATIVE MG/DL
COLOR UR AUTO: NORMAL
GLUCOSE UR STRIP.AUTO-MCNC: NEGATIVE MG/DL
INT CON NEG: NEGATIVE
INT CON POS: POSITIVE
KETONES UR STRIP.AUTO-MCNC: 15 MG/DL
LEUKOCYTE ESTERASE UR QL STRIP.AUTO: NORMAL
NITRITE UR QL STRIP.AUTO: NEGATIVE
PH UR STRIP.AUTO: 7 [PH] (ref 5–8)
POC URINE PREGNANCY TEST: POSITIVE
PROT UR QL STRIP: 30 MG/DL
RBC UR QL AUTO: NEGATIVE
SP GR UR STRIP.AUTO: 1.02
UROBILINOGEN UR STRIP-MCNC: 1 MG/DL

## 2022-05-10 PROCEDURE — 87086 URINE CULTURE/COLONY COUNT: CPT

## 2022-05-10 PROCEDURE — 81025 URINE PREGNANCY TEST: CPT | Performed by: PHYSICIAN ASSISTANT

## 2022-05-10 PROCEDURE — 87480 CANDIDA DNA DIR PROBE: CPT

## 2022-05-10 PROCEDURE — 87660 TRICHOMONAS VAGIN DIR PROBE: CPT

## 2022-05-10 PROCEDURE — 87510 GARDNER VAG DNA DIR PROBE: CPT

## 2022-05-10 PROCEDURE — 87591 N.GONORRHOEAE DNA AMP PROB: CPT

## 2022-05-10 PROCEDURE — 99213 OFFICE O/P EST LOW 20 MIN: CPT | Performed by: PHYSICIAN ASSISTANT

## 2022-05-10 PROCEDURE — 87491 CHLMYD TRACH DNA AMP PROBE: CPT

## 2022-05-10 PROCEDURE — 81002 URINALYSIS NONAUTO W/O SCOPE: CPT | Performed by: PHYSICIAN ASSISTANT

## 2022-05-10 ASSESSMENT — FIBROSIS 4 INDEX: FIB4 SCORE: 0.5

## 2022-05-10 NOTE — PROGRESS NOTES
"Subjective:   Joann Medley is a 21 y.o. female who presents for Sexually Transmitted Diseases (Wants testing because she is having burning during intercourse, and slight discharge. No other symptoms/)      HPI  Patient is a 21-year-old female here in the clinic requesting STD screening.  She reports of burning with sexual intercourse and slight white vaginal discharge for the past 3 days.  Denies any vaginal odor.  She reports she is in a monogamous relationship with a male unprotected sexual intercourse.  LMP: 2022.   .   Denies any fever, chills, abdominal pain, nausea, vomiting, flank pain, dysuria, urinary urgency, urinary frequency.         Medications:    • This patient does not have an active medication from one of the medication groupers.    Allergies: Patient has no known allergies.    Problem List: Joann Medley does not have any pertinent problems on file.    Surgical History:  No past surgical history on file.    Past Social Hx: Joann Medley  reports that she has never smoked. She has never used smokeless tobacco. She reports that she does not drink alcohol and does not use drugs.     Past Family Hx:  Joann Medley family history includes Cancer in her paternal grandmother; Hypertension in her father, maternal grandmother, and mother.     Problem list, medications, and allergies reviewed by myself today in Epic.     Objective:     /62   Pulse (!) 115   Temp 36.9 °C (98.5 °F) (Temporal)   Resp 18   Ht 1.575 m (5' 2\")   Wt 66.2 kg (146 lb)   SpO2 98%   BMI 26.70 kg/m²     Physical Exam  Vitals reviewed.   Constitutional:       General: She is not in acute distress.     Appearance: Normal appearance. She is not ill-appearing or toxic-appearing.   HENT:      Head: Normocephalic.   Eyes:      Conjunctiva/sclera: Conjunctivae normal.      Pupils: Pupils are equal, round, and reactive to light.   Cardiovascular:      Rate and Rhythm: " Normal rate and regular rhythm.      Heart sounds: Normal heart sounds.   Pulmonary:      Effort: Pulmonary effort is normal. No respiratory distress.      Breath sounds: Normal breath sounds. No wheezing, rhonchi or rales.   Abdominal:      General: Abdomen is flat. Bowel sounds are normal. There is no distension.      Palpations: Abdomen is soft. There is no mass.      Tenderness: There is no abdominal tenderness. There is no right CVA tenderness, left CVA tenderness, guarding or rebound.   Musculoskeletal:      Cervical back: Neck supple.   Skin:     General: Skin is warm and dry.   Neurological:      General: No focal deficit present.      Mental Status: She is alert and oriented to person, place, and time.   Psychiatric:         Mood and Affect: Mood normal.         Behavior: Behavior normal.         Diagnosis and associated orders:     1. Vaginal discharge  - VAGINAL PATHOGENS DNA PANEL; Future  - Chlamydia/GC, PCR (Genital/Anal swab); Future  - HIV AG/AB COMBO ASSAY SCREENING; Future  - T.PALLIDUM AB EIA; Future  - POCT Urinalysis  - URINE CULTURE(NEW); Future    2. Less than 8 weeks gestation of pregnancy  - VAGINAL PATHOGENS DNA PANEL; Future  - Chlamydia/GC, PCR (Genital/Anal swab); Future  - HIV AG/AB COMBO ASSAY SCREENING; Future  - T.PALLIDUM AB EIA; Future  - Referral to OB/Gyn       Comments/MDM:     • This is a pleasant 21-year-old female here in the clinic for STD screening.  See full history above.  hCG positive.  Evaluation as above.  We will call back with results and appropriate further instruction and treatment as necessary.  Avoid alcohol.  Avoid drugs.  Referral placed to OB for follow-up.       I personally reviewed prior external notes and test results pertinent to today's visit. Supportive care, natural history, differential diagnoses, and indications for immediate follow-up discussed. Patient expresses understanding and agrees to plan. Patient denies any other questions or concerns.      Follow-up with the primary care physician for recheck, reevaluation, and consideration of further management.    Please note that this dictation was created using voice recognition software. I have made a reasonable attempt to correct obvious errors, but I expect that there are errors of grammar and possibly content that I did not discover before finalizing the note.    This note was electronically signed by Miguel Hernandez PA-C

## 2022-05-11 LAB
C TRACH DNA GENITAL QL NAA+PROBE: NEGATIVE
CANDIDA DNA VAG QL PROBE+SIG AMP: POSITIVE
G VAGINALIS DNA VAG QL PROBE+SIG AMP: POSITIVE
N GONORRHOEA DNA GENITAL QL NAA+PROBE: NEGATIVE
SPECIMEN SOURCE: NORMAL
T VAGINALIS DNA VAG QL PROBE+SIG AMP: NEGATIVE

## 2022-05-12 RX ORDER — METRONIDAZOLE 7.5 MG/G
1 GEL VAGINAL
Qty: 5 EACH | Refills: 0 | Status: SHIPPED | OUTPATIENT
Start: 2022-05-12 | End: 2022-05-17

## 2022-05-13 LAB
BACTERIA UR CULT: NORMAL
SIGNIFICANT IND 70042: NORMAL
SITE SITE: NORMAL
SOURCE SOURCE: NORMAL

## 2022-05-20 ENCOUNTER — HOSPITAL ENCOUNTER (OUTPATIENT)
Dept: LAB | Facility: MEDICAL CENTER | Age: 22
End: 2022-05-20
Attending: PHYSICIAN ASSISTANT
Payer: COMMERCIAL

## 2022-05-20 DIAGNOSIS — Z3A.01 LESS THAN 8 WEEKS GESTATION OF PREGNANCY: ICD-10-CM

## 2022-05-20 DIAGNOSIS — N89.8 VAGINAL DISCHARGE: ICD-10-CM

## 2022-05-20 LAB
HIV 1+2 AB+HIV1 P24 AG SERPL QL IA: NORMAL
T PALLIDUM AB SER QL IA: NORMAL

## 2022-05-20 PROCEDURE — 36415 COLL VENOUS BLD VENIPUNCTURE: CPT

## 2022-05-20 PROCEDURE — 86780 TREPONEMA PALLIDUM: CPT

## 2022-05-20 PROCEDURE — 87389 HIV-1 AG W/HIV-1&-2 AB AG IA: CPT

## 2022-08-21 ENCOUNTER — HOSPITAL ENCOUNTER (EMERGENCY)
Facility: MEDICAL CENTER | Age: 22
End: 2022-08-21
Attending: EMERGENCY MEDICINE
Payer: COMMERCIAL

## 2022-08-21 VITALS
SYSTOLIC BLOOD PRESSURE: 115 MMHG | TEMPERATURE: 97.7 F | OXYGEN SATURATION: 97 % | WEIGHT: 160 LBS | HEART RATE: 85 BPM | DIASTOLIC BLOOD PRESSURE: 74 MMHG | RESPIRATION RATE: 16 BRPM

## 2022-08-21 DIAGNOSIS — R11.10 VOMITING, INTRACTABILITY OF VOMITING NOT SPECIFIED, PRESENCE OF NAUSEA NOT SPECIFIED, UNSPECIFIED VOMITING TYPE: ICD-10-CM

## 2022-08-21 DIAGNOSIS — F10.920 ACUTE ALCOHOLIC INTOXICATION WITHOUT COMPLICATION (HCC): ICD-10-CM

## 2022-08-21 DIAGNOSIS — R40.4 ALTERED LEVEL OF CONSCIOUSNESS: ICD-10-CM

## 2022-08-21 PROCEDURE — 96374 THER/PROPH/DIAG INJ IV PUSH: CPT

## 2022-08-21 PROCEDURE — 700105 HCHG RX REV CODE 258: Performed by: EMERGENCY MEDICINE

## 2022-08-21 PROCEDURE — 99285 EMERGENCY DEPT VISIT HI MDM: CPT

## 2022-08-21 PROCEDURE — 700111 HCHG RX REV CODE 636 W/ 250 OVERRIDE (IP): Performed by: EMERGENCY MEDICINE

## 2022-08-21 RX ORDER — ONDANSETRON 4 MG/1
4 TABLET, ORALLY DISINTEGRATING ORAL EVERY 6 HOURS PRN
Qty: 8 TABLET | Refills: 0 | Status: SHIPPED | OUTPATIENT
Start: 2022-08-21 | End: 2023-06-05

## 2022-08-21 RX ORDER — SODIUM CHLORIDE, SODIUM LACTATE, POTASSIUM CHLORIDE, CALCIUM CHLORIDE 600; 310; 30; 20 MG/100ML; MG/100ML; MG/100ML; MG/100ML
1000 INJECTION, SOLUTION INTRAVENOUS ONCE
Status: COMPLETED | OUTPATIENT
Start: 2022-08-21 | End: 2022-08-21

## 2022-08-21 RX ORDER — ONDANSETRON 2 MG/ML
4 INJECTION INTRAMUSCULAR; INTRAVENOUS ONCE
Status: COMPLETED | OUTPATIENT
Start: 2022-08-21 | End: 2022-08-21

## 2022-08-21 RX ADMIN — ONDANSETRON 4 MG: 2 INJECTION INTRAMUSCULAR; INTRAVENOUS at 02:51

## 2022-08-21 RX ADMIN — SODIUM CHLORIDE, POTASSIUM CHLORIDE, SODIUM LACTATE AND CALCIUM CHLORIDE 1000 ML: 600; 310; 30; 20 INJECTION, SOLUTION INTRAVENOUS at 02:56

## 2022-08-21 NOTE — ED NOTES
PT friend named MJ called and reported she had Joann's phone and ID.  Friend would like to be called when PT is ready to go home.  [phone]

## 2022-08-21 NOTE — ED TRIAGE NOTES
Chief Complaint   Patient presents with    Alcohol Intoxication    ALOC     Pt BIB EMS from [facility] for above complaints. Per EMS pt was at [facility] with friend drinking and found passed out in chair. Staff called 911. Pt maintaining airway at this time. Pt unable to be aroused by RN, unable to answer questions. PIV placed by EMS, FSBG 157.

## 2022-08-21 NOTE — ED PROVIDER NOTES
Patient:Raul Claros  Patient : 1901  Patient MRN: [id number]  Patient PCP: No primary care provider on file.    Admit Date: 2022  Discharge Date and Time: 22 5:37 AM  Discharge Diagnosis:   1. Acute alcoholic intoxication without complication (HCC)  ondansetron (ZOFRAN ODT) 4 MG TABLET DISPERSIBLE      2. Vomiting, intractability of vomiting not specified, presence of nausea not specified, unspecified vomiting type  ondansetron (ZOFRAN ODT) 4 MG TABLET DISPERSIBLE      3. Altered level of consciousness      2/2 ETOH intoxication        Discharge Attending: Darlene Li D.O.  Discharge Service: ED Observation    ED Course  Raul is a 89 y.o. female who was evaluated at [facility] initially by my colleague.  She was quite intoxicated.  She was treated with antiemetics.  She is placed in ED observation.  With plan for allowing for metabolism of alcohol and reassessment.    1:21 AM patient is seen at bedside.  She is resting and appears comfortable.  Vital signs are reassuring.  We will continue to monitor    0537AM She is resting and appears comfortable.  Vital signs are reassuring.  No further emesis.    Patient's noted ambulating to the bathroom.  She is ambulating independently without difficulty, nursing staff accompanies her.    0654AM patient seen at the bedside.  She is laughing and joking with her boyfriend.  She is very much awake and alert now.  She has no complaints and she is eager to go home and I think this is a reasonable disposition.  She is well-appearing and nontoxic.  She is discharged in stable condition.     Discharge Exam:  /74   Pulse 85   Temp 36.5 °C (97.7 °F) (Temporal)   Resp 16   Wt 72.6 kg (160 lb)   SpO2 97% .    Constitutional: Awake and alert. Nontoxic  HENT:  Grossly normal  Eyes: Grossly normal  Neck: Normal range of motion  Cardiovascular: Normal heart rate   Thorax & Lungs: No respiratory distress  Abdomen: Nontender  Skin:  No pathologic  rash.   Extremities: Well perfused  Psychiatric: Affect normal      Medications:   Discharge Medication List as of 8/21/2022  6:55 AM        START taking these medications    Details   ondansetron (ZOFRAN ODT) 4 MG TABLET DISPERSIBLE Take 1 Tablet by mouth every 6 hours as needed for Nausea., Disp-8 Tablet, R-0, Print Rx Paper             Discharge Condition: Stable    Electronically signed by: Darlene Li D.O., 8/21/2022 5:37 AM

## 2022-08-21 NOTE — ED NOTES
Pt's boyfriend at bedside to give pt ride home. Pt provided discharge instructions and follow-up information. Pt verbalized understanding and all questions answered. PIV removed. Pt ambulated from ED with steady gait carrying all belongings.

## 2022-08-21 NOTE — ED NOTES
Pt resting on gurney, NAD noted, respirations even and unlabored. Fluids finished. No signs of N/V following medication admin.

## 2022-08-21 NOTE — ED NOTES
Pt ambulatory to BR with steady gait. Pt A/Ox4 and speaking in full sentences.   ERP updated on pt condition.

## 2022-08-21 NOTE — ED NOTES
Pt resting on gurney, NAD noted. Pt aroused to verbal stimuli and managing emesis with no sing of aspiration. Pt viably shivering, provided additional warm blankets. Fluids running.

## 2022-08-21 NOTE — ED PROVIDER NOTES
ED Provider Note    CHIEF COMPLAINT  Chief Complaint   Patient presents with    Alcohol Intoxication    ALOC       HPI  Raul Claros is a young appearing female likely in her late 20s to early 30s who presents to the emergency department with alcohol intoxication.  She was drinking at the [facility] with her friend who was also intoxicated when she called 911 as she had become unresponsive in a chair.  She had a normal glucose prior to arrival on my bedside evaluation we will set up to voice open her eyes and then is kind of slumped back down.  Not speaking but not in any respiratory distress    REVIEW OF SYSTEMS  Limited secondary to intoxication  All other review of systems are negative    PAST MEDICAL HISTORY       SOCIAL HISTORY  Alcohol use otherwise limited secondary to intoxication      SURGICAL HISTORY  patient denies any surgical history    CURRENT MEDICATIONS  Home Medications    **Home medications have not yet been reviewed for this encounter**         ALLERGIES  Not on File    PHYSICAL EXAM  VITAL SIGNS: /59   Pulse 81   Resp 14   SpO2 99%    Pulse ox interpretation: I interpret this pulse ox as normal.  Constitutional: Somnolent but arouses to voice  HENT: Normocephalic atraumatic, MMM  Eyes: PER, Conjunctiva normal, Non-icteric.   Neck: No signs of trauma or swelling or tenderness  Cardiovascular: Regular rate and rhythm, no murmurs.   Thorax & Lungs: Normal breath sounds, No respiratory distress, No wheezing, No chest tenderness.   Abdomen: Bowel sounds normal, Soft, No tenderness, No pulsatile masses. No peritoneal signs.  Extremities/MSK: Intact equal distal pulses, No edema, No tenderness, No cyanosis, no major deformities noted  Neurologic: Was able to sit up but otherwise unable to perform focal exam but was able to move all extremities to tactile stimuli not appear to be more weak than the other      DIFFERENTIAL DIAGNOSIS AND WORK UP PLAN    This is a young female who appears otherwise well  beyond intoxicated.  She had a little bit of heaving at the time I evaluated social be treated with some Zofran and some IV fluids and then observed here in the emergency department until she can be sober.  There is no signs or symptoms of trauma on her history is consistent with intoxication.    COURSE & MEDICAL DECISION MAKING  Pertinent Labs & Imaging studies reviewed. (See chart for details)    2:52 AM  Before the Zofran can be given the patient sat up and started having emesis I went in and helped her sit up she was holding her own head up throwing up without aspirating.      3:06 AM  Patient was admitted to ED observation for alcohol intoxication and is awaiting metabolization.  She was signed out to my partner for further evaluation    Unable to perform family history    I verified that the patient was wearing a mask and I was wearing appropriate PPE every time I entered the room. The patient's mask was on the patient at all times during my encounter except for a brief view of the oropharynx.          FINAL IMPRESSION  1. Acute alcoholic intoxication without complication (HCC)  ondansetron (ZOFRAN ODT) 4 MG TABLET DISPERSIBLE      2. Vomiting, intractability of vomiting not specified, presence of nausea not specified, unspecified vomiting type  ondansetron (ZOFRAN ODT) 4 MG TABLET DISPERSIBLE              Electronically signed by: Bridgette John M.D., 8/21/2022 2:36 AM    This dictation has been created using voice recognition software and/or scribes. The accuracy of the dictation is limited by the abilities of the software and the expertise of the scribes. I expect there may be some errors of grammar and possibly content. I made every attempt to manually correct the errors within my dictation. However, errors related to voice recognition software and/or scribes may still exist and should be interpreted within the appropriate context.

## 2023-06-05 ENCOUNTER — HOSPITAL ENCOUNTER (OUTPATIENT)
Dept: LAB | Facility: MEDICAL CENTER | Age: 23
End: 2023-06-05
Attending: FAMILY MEDICINE
Payer: MEDICAID

## 2023-06-05 ENCOUNTER — OFFICE VISIT (OUTPATIENT)
Dept: MEDICAL GROUP | Facility: MEDICAL CENTER | Age: 23
End: 2023-06-05
Attending: FAMILY MEDICINE
Payer: MEDICAID

## 2023-06-05 VITALS
BODY MASS INDEX: 26.87 KG/M2 | RESPIRATION RATE: 16 BRPM | OXYGEN SATURATION: 98 % | SYSTOLIC BLOOD PRESSURE: 110 MMHG | WEIGHT: 146 LBS | HEART RATE: 68 BPM | DIASTOLIC BLOOD PRESSURE: 72 MMHG | HEIGHT: 62 IN | TEMPERATURE: 97.3 F

## 2023-06-05 DIAGNOSIS — Z11.3 SCREEN FOR STD (SEXUALLY TRANSMITTED DISEASE): ICD-10-CM

## 2023-06-05 DIAGNOSIS — R22.2 LUMP OF SKIN OF BACK: ICD-10-CM

## 2023-06-05 DIAGNOSIS — F41.9 ANXIETY AND DEPRESSION: ICD-10-CM

## 2023-06-05 DIAGNOSIS — Z13.6 SCREENING FOR CARDIOVASCULAR CONDITION: ICD-10-CM

## 2023-06-05 DIAGNOSIS — F32.A ANXIETY AND DEPRESSION: ICD-10-CM

## 2023-06-05 LAB
ALBUMIN SERPL BCP-MCNC: 4.2 G/DL (ref 3.2–4.9)
ALBUMIN/GLOB SERPL: 1.3 G/DL
ALP SERPL-CCNC: 42 U/L (ref 30–99)
ALT SERPL-CCNC: 13 U/L (ref 2–50)
ANION GAP SERPL CALC-SCNC: 10 MMOL/L (ref 7–16)
AST SERPL-CCNC: 22 U/L (ref 12–45)
BASOPHILS # BLD AUTO: 1.9 % (ref 0–1.8)
BASOPHILS # BLD: 0.04 K/UL (ref 0–0.12)
BILIRUB SERPL-MCNC: 0.5 MG/DL (ref 0.1–1.5)
BUN SERPL-MCNC: 12 MG/DL (ref 8–22)
CALCIUM ALBUM COR SERPL-MCNC: 9.2 MG/DL (ref 8.5–10.5)
CALCIUM SERPL-MCNC: 9.4 MG/DL (ref 8.5–10.5)
CHLORIDE SERPL-SCNC: 108 MMOL/L (ref 96–112)
CHOLEST SERPL-MCNC: 224 MG/DL (ref 100–199)
CO2 SERPL-SCNC: 22 MMOL/L (ref 20–33)
CREAT SERPL-MCNC: 0.98 MG/DL (ref 0.5–1.4)
EOSINOPHIL # BLD AUTO: 0.02 K/UL (ref 0–0.51)
EOSINOPHIL NFR BLD: 1 % (ref 0–6.9)
ERYTHROCYTE [DISTWIDTH] IN BLOOD BY AUTOMATED COUNT: 42.9 FL (ref 35.9–50)
EST. AVERAGE GLUCOSE BLD GHB EST-MCNC: 94 MG/DL
GFR SERPLBLD CREATININE-BSD FMLA CKD-EPI: 83 ML/MIN/1.73 M 2
GLOBULIN SER CALC-MCNC: 3.3 G/DL (ref 1.9–3.5)
GLUCOSE SERPL-MCNC: 83 MG/DL (ref 65–99)
HBA1C MFR BLD: 4.9 % (ref 4–5.6)
HCT VFR BLD AUTO: 36.3 % (ref 37–47)
HCV AB SER QL: NORMAL
HDLC SERPL-MCNC: 71 MG/DL
HGB BLD-MCNC: 11.1 G/DL (ref 12–16)
HIV 1+2 AB+HIV1 P24 AG SERPL QL IA: NORMAL
IMM GRANULOCYTES # BLD AUTO: 0 K/UL (ref 0–0.11)
IMM GRANULOCYTES NFR BLD AUTO: 0 % (ref 0–0.9)
LDLC SERPL CALC-MCNC: 146 MG/DL
LYMPHOCYTES # BLD AUTO: 1.15 K/UL (ref 1–4.8)
LYMPHOCYTES NFR BLD: 54.8 % (ref 22–41)
MCH RBC QN AUTO: 25.8 PG (ref 27–33)
MCHC RBC AUTO-ENTMCNC: 30.6 G/DL (ref 32.2–35.5)
MCV RBC AUTO: 84.2 FL (ref 81.4–97.8)
MONOCYTES # BLD AUTO: 0.17 K/UL (ref 0–0.85)
MONOCYTES NFR BLD AUTO: 8.1 % (ref 0–13.4)
NEUTROPHILS # BLD AUTO: 0.72 K/UL (ref 1.82–7.42)
NEUTROPHILS NFR BLD: 34.2 % (ref 44–72)
NRBC # BLD AUTO: 0 K/UL
NRBC BLD-RTO: 0 /100 WBC (ref 0–0.2)
PLATELET # BLD AUTO: 267 K/UL (ref 164–446)
PMV BLD AUTO: 11 FL (ref 9–12.9)
POTASSIUM SERPL-SCNC: 4.2 MMOL/L (ref 3.6–5.5)
PROT SERPL-MCNC: 7.5 G/DL (ref 6–8.2)
RBC # BLD AUTO: 4.31 M/UL (ref 4.2–5.4)
SODIUM SERPL-SCNC: 140 MMOL/L (ref 135–145)
T PALLIDUM AB SER QL IA: NORMAL
TRIGL SERPL-MCNC: 33 MG/DL (ref 0–149)
WBC # BLD AUTO: 2.1 K/UL (ref 4.8–10.8)

## 2023-06-05 PROCEDURE — 99214 OFFICE O/P EST MOD 30 MIN: CPT | Performed by: FAMILY MEDICINE

## 2023-06-05 PROCEDURE — 3074F SYST BP LT 130 MM HG: CPT | Performed by: FAMILY MEDICINE

## 2023-06-05 PROCEDURE — 3078F DIAST BP <80 MM HG: CPT | Performed by: FAMILY MEDICINE

## 2023-06-05 PROCEDURE — 36415 COLL VENOUS BLD VENIPUNCTURE: CPT

## 2023-06-05 PROCEDURE — 87591 N.GONORRHOEAE DNA AMP PROB: CPT

## 2023-06-05 PROCEDURE — 86780 TREPONEMA PALLIDUM: CPT

## 2023-06-05 PROCEDURE — 87491 CHLMYD TRACH DNA AMP PROBE: CPT

## 2023-06-05 PROCEDURE — 80061 LIPID PANEL: CPT

## 2023-06-05 PROCEDURE — 99212 OFFICE O/P EST SF 10 MIN: CPT | Performed by: FAMILY MEDICINE

## 2023-06-05 PROCEDURE — 86803 HEPATITIS C AB TEST: CPT

## 2023-06-05 PROCEDURE — 85025 COMPLETE CBC W/AUTO DIFF WBC: CPT

## 2023-06-05 PROCEDURE — 83036 HEMOGLOBIN GLYCOSYLATED A1C: CPT

## 2023-06-05 PROCEDURE — 87389 HIV-1 AG W/HIV-1&-2 AB AG IA: CPT

## 2023-06-05 PROCEDURE — 80053 COMPREHEN METABOLIC PANEL: CPT

## 2023-06-05 ASSESSMENT — ANXIETY QUESTIONNAIRES
GAD7 TOTAL SCORE: 5
2. NOT BEING ABLE TO STOP OR CONTROL WORRYING: SEVERAL DAYS
3. WORRYING TOO MUCH ABOUT DIFFERENT THINGS: SEVERAL DAYS
7. FEELING AFRAID AS IF SOMETHING AWFUL MIGHT HAPPEN: NOT AT ALL
5. BEING SO RESTLESS THAT IT IS HARD TO SIT STILL: NOT AT ALL
1. FEELING NERVOUS, ANXIOUS, OR ON EDGE: SEVERAL DAYS
6. BECOMING EASILY ANNOYED OR IRRITABLE: SEVERAL DAYS
4. TROUBLE RELAXING: SEVERAL DAYS

## 2023-06-05 ASSESSMENT — FIBROSIS 4 INDEX: FIB4 SCORE: 0.52

## 2023-06-05 ASSESSMENT — PATIENT HEALTH QUESTIONNAIRE - PHQ9
5. POOR APPETITE OR OVEREATING: 0 - NOT AT ALL
CLINICAL INTERPRETATION OF PHQ2 SCORE: 1
SUM OF ALL RESPONSES TO PHQ QUESTIONS 1-9: 4

## 2023-06-05 NOTE — PROGRESS NOTES
"Subjective:     CC:    Chief Complaint   Patient presents with    Establish Care       HISTORY OF THE PRESENT ILLNESS: Patient is a 22 y.o. female. This pleasant patient is here today to establish primary care with me and discuss the following issues.   Her prior PCP was Sophie GALLEGO ; last seen 5/19/2021    Specialists   [facility]    Lump of skin of back  First noticed about a year ago. Denies any change in size or appearance. Does not cause pain or discomfort. First noticed when she was stretching and felt a \"bump\". Not otherwise noticeable. Denies any other lumps or masses elsewhere. No prior similar episodes.    Screening examination for sexually transmitted disease  Sexually active with 1 male partner, uses condoms inconsistently for STI and pregnancy prevent. History of chlamydia in the past which was treated.     Anxiety and depression  Recently evaluated by psychologist for ADHD which was rule out. She was diagnosed with mild anxiety and depression which she has been able to manage off of medications.      Allergies: Patient has no known allergies.      [facility] [address] [place], [place] [address] [address] [address]  Phone: [phone] Fax: [phone]    [facility] - [address] [address]  Phone: [phone] Fax: [phone]      No current outpatient medications on file.     No current facility-administered medications for this visit.       Past Medical History:   Diagnosis Date    Patient denies medical problems        History reviewed. No pertinent surgical history.    Social History     Tobacco Use    Smoking status: Never    Smokeless tobacco: Never   Vaping Use    Vaping Use: Never used   Substance Use Topics    Alcohol use: No     Alcohol/week: 0.0 oz     Comment: occ    Drug use: Never       Family History   Problem Relation Age of Onset    Hypertension Mother     Hypertension Father     Hypertension Maternal " "Grandmother     Cancer Paternal Grandmother         ?    Diabetes Neg Hx     Lung Disease Neg Hx     Heart Disease Neg Hx     Hyperlipidemia Neg Hx          Objective:     /72   Pulse 68   Temp 36.3 °C (97.3 °F)   Resp 16   Ht 1.575 m (5' 2.01\")   Wt 66.2 kg (146 lb)   SpO2 98%   BMI 26.70 kg/m²   Physical Exam  Skin:            Comments: Appx 1 cm firm, well defined, mobile nodule. No tenderness to palpation, no overlying skin changes       Constitutional: Alert, no distress  Skin: No rashes in visible areas  Eye: Conjunctiva clear, lids normal  Respiratory: Unlabored respiratory effort, no cough, lungs clear to auscultation bilaterally  CV: RRR  MSK: Normal gait, moves all extremities.  Psych: Alert and oriented x3, normal affect and mood      Assessment & Plan:   22 y.o. female with the following -    1. Lump of skin of back  - Chronic issue; never previously addressed. Discussed that mass likely benign, cyst or nodule, recommend continue clinical monitoring for now as she is otherwise asymptomatic. Can consider US of mass in future if becomes bothersome.    2. Anxiety and depression  - Chronic, clinically well controlled off of medications. Follows with psychology for management of this.    3. Screen for STD (sexually transmitted disease)  - Routine screen as per patient request. Recommend MVI for folate supplementation in case of unintended pregnancy. Encouraged condom use 100% and continued safer sex practices  - HIV AG/AB COMBO ASSAY SCREENING; Future  - HEP C VIRUS ANTIBODY; Future  - Chlamydia/GC, PCR (Urine); Future  - T.PALLIDUM AB KP (SCREENING); Future    4. Screening for cardiovascular condition  Orders as noted below for exclusionary, confirmatory, and risk stratification purposes:  - HEMOGLOBIN A1C; Future  - Lipid Profile; Future  - Comp Metabolic Panel; Future  - CBC WITH DIFFERENTIAL; Future       Return for routine annual wellness exam, pap smear.    Please note that this dictation " was created using voice recognition software. I have made every reasonable attempt to correct obvious errors, but I expect that there are errors of grammar and possibly content that I did not discover before finalizing the note.   right upper arm

## 2023-06-05 NOTE — ASSESSMENT & PLAN NOTE
Sexually active with 1 male partner, uses condoms inconsistently for STI and pregnancy prevent. History of chlamydia in the past which was treated.

## 2023-06-05 NOTE — ASSESSMENT & PLAN NOTE
"First noticed about a year ago. Denies any change in size or appearance. Does not cause pain or discomfort. First noticed when she was stretching and felt a \"bump\". Not otherwise noticeable. Denies any other lumps or masses elsewhere. No prior similar episodes.  "

## 2023-06-05 NOTE — ASSESSMENT & PLAN NOTE
Recently evaluated by psychologist for ADHD which was rule out. She was diagnosed with mild anxiety and depression which she has been able to manage off of medications.

## 2023-07-17 ENCOUNTER — OFFICE VISIT (OUTPATIENT)
Dept: URGENT CARE | Facility: CLINIC | Age: 23
End: 2023-07-17
Payer: MEDICAID

## 2023-07-17 ENCOUNTER — HOSPITAL ENCOUNTER (OUTPATIENT)
Facility: MEDICAL CENTER | Age: 23
End: 2023-07-17
Attending: PHYSICIAN ASSISTANT
Payer: MEDICAID

## 2023-07-17 VITALS
BODY MASS INDEX: 25.75 KG/M2 | SYSTOLIC BLOOD PRESSURE: 110 MMHG | OXYGEN SATURATION: 99 % | DIASTOLIC BLOOD PRESSURE: 74 MMHG | TEMPERATURE: 98.3 F | HEART RATE: 89 BPM | WEIGHT: 145.3 LBS | RESPIRATION RATE: 18 BRPM | HEIGHT: 63 IN

## 2023-07-17 DIAGNOSIS — N30.01 ACUTE CYSTITIS WITH HEMATURIA: ICD-10-CM

## 2023-07-17 DIAGNOSIS — R39.15 URINARY URGENCY: ICD-10-CM

## 2023-07-17 DIAGNOSIS — N76.0 ACUTE VAGINITIS: ICD-10-CM

## 2023-07-17 LAB
APPEARANCE UR: NORMAL
BILIRUB UR STRIP-MCNC: NEGATIVE MG/DL
COLOR UR AUTO: YELLOW
GLUCOSE UR STRIP.AUTO-MCNC: NEGATIVE MG/DL
KETONES UR STRIP.AUTO-MCNC: NORMAL MG/DL
LEUKOCYTE ESTERASE UR QL STRIP.AUTO: NORMAL
NITRITE UR QL STRIP.AUTO: NEGATIVE
PH UR STRIP.AUTO: 5.5 [PH] (ref 5–8)
PROT UR QL STRIP: 30 MG/DL
RBC UR QL AUTO: NORMAL
SP GR UR STRIP.AUTO: 1.02
UROBILINOGEN UR STRIP-MCNC: 0.2 MG/DL

## 2023-07-17 PROCEDURE — 87086 URINE CULTURE/COLONY COUNT: CPT

## 2023-07-17 PROCEDURE — 87480 CANDIDA DNA DIR PROBE: CPT

## 2023-07-17 PROCEDURE — 87660 TRICHOMONAS VAGIN DIR PROBE: CPT

## 2023-07-17 PROCEDURE — 81002 URINALYSIS NONAUTO W/O SCOPE: CPT | Performed by: PHYSICIAN ASSISTANT

## 2023-07-17 PROCEDURE — 87510 GARDNER VAG DNA DIR PROBE: CPT

## 2023-07-17 PROCEDURE — 3078F DIAST BP <80 MM HG: CPT | Performed by: PHYSICIAN ASSISTANT

## 2023-07-17 PROCEDURE — 3074F SYST BP LT 130 MM HG: CPT | Performed by: PHYSICIAN ASSISTANT

## 2023-07-17 PROCEDURE — 99213 OFFICE O/P EST LOW 20 MIN: CPT | Performed by: PHYSICIAN ASSISTANT

## 2023-07-17 RX ORDER — METRONIDAZOLE 7.5 MG/G
1 GEL VAGINAL
Qty: 5 EACH | Refills: 0 | Status: SHIPPED | OUTPATIENT
Start: 2023-07-17 | End: 2023-07-18

## 2023-07-17 RX ORDER — FLUCONAZOLE 150 MG/1
150 TABLET ORAL DAILY
Qty: 2 TABLET | Refills: 0 | Status: SHIPPED | OUTPATIENT
Start: 2023-07-17 | End: 2023-07-19

## 2023-07-17 RX ORDER — NITROFURANTOIN 25; 75 MG/1; MG/1
100 CAPSULE ORAL 2 TIMES DAILY
Qty: 14 CAPSULE | Refills: 0 | Status: SHIPPED | OUTPATIENT
Start: 2023-07-17 | End: 2023-07-24

## 2023-07-17 ASSESSMENT — ENCOUNTER SYMPTOMS
ABDOMINAL PAIN: 0
SHORTNESS OF BREATH: 0
HEADACHES: 0
SORE THROAT: 0
MYALGIAS: 0
CHILLS: 0
VOMITING: 0
DIARRHEA: 0
FEVER: 0
FLANK PAIN: 0
NAUSEA: 0
DIZZINESS: 0

## 2023-07-17 ASSESSMENT — FIBROSIS 4 INDEX: FIB4 SCORE: 0.53

## 2023-07-18 ENCOUNTER — TELEPHONE (OUTPATIENT)
Dept: URGENT CARE | Facility: CLINIC | Age: 23
End: 2023-07-18

## 2023-07-18 DIAGNOSIS — A59.01 TRICHOMONIASIS OF VAGINA: ICD-10-CM

## 2023-07-18 LAB
CANDIDA DNA VAG QL PROBE+SIG AMP: NEGATIVE
G VAGINALIS DNA VAG QL PROBE+SIG AMP: NEGATIVE
T VAGINALIS DNA VAG QL PROBE+SIG AMP: POSITIVE

## 2023-07-18 RX ORDER — METRONIDAZOLE 500 MG/1
500 TABLET ORAL 2 TIMES DAILY
Qty: 14 TABLET | Refills: 0 | Status: SHIPPED | OUTPATIENT
Start: 2023-07-18 | End: 2023-07-25

## 2023-07-18 NOTE — PROGRESS NOTES
"Subjective     Joann Medley is a 22 y.o. female who presents with Urinary Frequency (X 6 days, and vaginal itching)            Patient presents with:  Urinary Frequency and increasing urgency  X 6 days, and vaginal itching and slight discharge that started a few days ago. No fever, rash, vaginal bleeding. Pt has hx of BV, candidiasis as well as UTI so she is having a hard time discerning what she thinks is going on. Pt denies otc medications/treatments for her symptoms.  No other complaint.         Urinary Frequency  This is a new problem. The current episode started in the past 7 days. The problem occurs constantly. The problem has been gradually worsening. Associated symptoms include urinary symptoms. Pertinent negatives include no abdominal pain, chest pain, chills, congestion, fever, headaches, myalgias, nausea, rash, sore throat or vomiting. The symptoms are aggravated by intercourse (urination). She has tried drinking for the symptoms. The treatment provided no relief.       Review of Systems   Constitutional:  Negative for chills and fever.   HENT:  Negative for congestion and sore throat.    Respiratory:  Negative for shortness of breath.    Cardiovascular:  Negative for chest pain.   Gastrointestinal:  Negative for abdominal pain, diarrhea, nausea and vomiting.   Genitourinary:  Positive for dysuria, frequency and urgency. Negative for flank pain and hematuria.   Musculoskeletal:  Negative for myalgias.   Skin:  Negative for rash.   Neurological:  Negative for dizziness and headaches.   All other systems reviewed and are negative.             Objective     /74   Pulse 89   Temp 36.8 °C (98.3 °F) (Temporal)   Resp 18   Ht 1.588 m (5' 2.5\") Comment: per pt  Wt 65.9 kg (145 lb 4.8 oz) Comment: w shoes  LMP 07/02/2023   SpO2 99%   BMI 26.15 kg/m²      Physical Exam  Vitals and nursing note reviewed.   Constitutional:       General: She is not in acute distress.     Appearance: Normal " appearance. She is well-developed and normal weight. She is not toxic-appearing.   HENT:      Head: Normocephalic and atraumatic.      Nose: Nose normal.      Mouth/Throat:      Mouth: Mucous membranes are moist.   Eyes:      Extraocular Movements: Extraocular movements intact.      Conjunctiva/sclera: Conjunctivae normal.      Pupils: Pupils are equal, round, and reactive to light.   Cardiovascular:      Rate and Rhythm: Normal rate and regular rhythm.      Pulses: Normal pulses.      Heart sounds: Normal heart sounds.   Pulmonary:      Effort: Pulmonary effort is normal.      Breath sounds: Normal breath sounds.   Abdominal:      General: Bowel sounds are normal.      Palpations: Abdomen is soft.      Tenderness: There is no guarding or rebound.   Genitourinary:     Comments: Deferred, pt self swab   Musculoskeletal:         General: Normal range of motion.      Cervical back: Normal range of motion and neck supple.   Skin:     General: Skin is warm and dry.      Capillary Refill: Capillary refill takes less than 2 seconds.   Neurological:      General: No focal deficit present.      Mental Status: She is alert and oriented to person, place, and time.      Gait: Gait normal.   Psychiatric:         Mood and Affect: Mood normal.         Behavior: Behavior is cooperative.                             Assessment & Plan              1. Urinary urgency  POCT Urinalysis    URINE CULTURE(NEW)    VAGINAL PATHOGENS DNA PANEL    nitrofurantoin (MACROBID) 100 MG Cap    fluconazole (DIFLUCAN) 150 MG tablet    metroNIDAZOLE (METROGEL-VAGINAL) 0.75 % Gel      2. Acute cystitis with hematuria  POCT Urinalysis    URINE CULTURE(NEW)    VAGINAL PATHOGENS DNA PANEL    nitrofurantoin (MACROBID) 100 MG Cap    fluconazole (DIFLUCAN) 150 MG tablet    metroNIDAZOLE (METROGEL-VAGINAL) 0.75 % Gel      3. Acute vaginitis  POCT Urinalysis    URINE CULTURE(NEW)    VAGINAL PATHOGENS DNA PANEL    nitrofurantoin (MACROBID) 100 MG Cap     fluconazole (DIFLUCAN) 150 MG tablet    metroNIDAZOLE (METROGEL-VAGINAL) 0.75 % Gel        PT has hx of UTI, BV and vaginal candidiasis.      UA dip positive for LE, blood.  Preg: neg  Culture sent to lab, will call with any necessary treatment or treatment changes.       I will send vaginal pathogens to lab, will notify pt of any necessary treatments.     Differential diagnosis, supportive care, and indications for immediate follow-up discussed with patient.  Instructed to return to clinic or nearest emergency department for any change in condition, further concerns, or worsening of symptoms.    I personally reviewed prior external notes and test results pertinent to today's visit.  I have independently reviewed and interpreted all diagnostics ordered during this urgent care visit.    PT should follow up with PCP in 1-2 days for re-evaluation if symptoms have not improved.      Discussed red flags and reasons to return to UC or ED.      Pt and/or family verbalized understanding of diagnosis and follow up instructions and was offered informational handout on diagnosis.  PT discharged.     Please note that this dictation was created using voice recognition software. I have made every reasonable attempt to correct obvious errors, but I expect that there may be errors of grammar and possibly content that I did not discover before finalizing the note.

## 2023-07-19 NOTE — RESULT ENCOUNTER NOTE
Good morning Shantel! I spoke to the patient and she has started taking the prescription you sent over.

## 2023-07-20 LAB
BACTERIA UR CULT: NORMAL
SIGNIFICANT IND 70042: NORMAL
SITE SITE: NORMAL
SOURCE SOURCE: NORMAL

## 2023-07-31 NOTE — LACTATION NOTE
Lactation note:  Initial visit. NKECHI Wilkins assisting with latch and positioning. Infant noted to to be tongue sucking. Encouraged MOB to wait for wide open mouth. MOB able to follow verbal instruction and reposition to cross cradle. Showed MOB how to do hand expression. Discussed normal  feeding behaviors and normal course of breastfeeding at 12-24-48-72 hours, and what to expect. Discussed importance of offering breast with feeding cues or at least every 2-3 hours, and even if infant shows no interest, can do hand expression into infant's lips. Encouraged to continue doing skin to skin. Discussed signs of a good latch, voiding and stooling patterns, feeding cues, stomach size, and importance of establishing milk supply with frequency of feedings.  New Beginning booklet given, and breastfeeding content reviewed.   Plan for tonight is to continue to offer breast first, if not latching well, can hand express colostrum, and refeed to infant.    Encouraged her to continue to work on deep latch, and skin 2 skin, with hand expression.     MOB has WIC on [address]. Encouraged to call and make an appointment for lactation support.   Information and phone number to the [facility] & invited to breastfeeding circles.    MOB has no other questions or concerns regarding breastfeeding. Encouraged to call for assistance as needed.   [de-identified] : 7/31/2023: Patient presents with low back pain rating down the back of her left leg.  This been going on about 3 months.  Denies any inciting event.  Was intermittent and is worsened.  Denies red flag symptoms.  Has not had any prior treatment.  Denies allergies anticoagulation or past medical history.

## 2024-01-26 ENCOUNTER — OFFICE VISIT (OUTPATIENT)
Dept: MEDICAL GROUP | Facility: MEDICAL CENTER | Age: 24
End: 2024-01-26
Attending: FAMILY MEDICINE
Payer: MEDICAID

## 2024-01-26 VITALS
RESPIRATION RATE: 16 BRPM | OXYGEN SATURATION: 98 % | WEIGHT: 139 LBS | TEMPERATURE: 97.7 F | HEART RATE: 80 BPM | SYSTOLIC BLOOD PRESSURE: 110 MMHG | DIASTOLIC BLOOD PRESSURE: 70 MMHG | HEIGHT: 63 IN | BODY MASS INDEX: 24.63 KG/M2

## 2024-01-26 DIAGNOSIS — L02.224 BOIL, GROIN: ICD-10-CM

## 2024-01-26 DIAGNOSIS — Z23 NEED FOR VACCINATION: ICD-10-CM

## 2024-01-26 DIAGNOSIS — R79.89 HIGH SERUM LOW DENSITY LIPOPROTEIN (LDL) CHOLESTEROL: ICD-10-CM

## 2024-01-26 PROCEDURE — 99214 OFFICE O/P EST MOD 30 MIN: CPT | Performed by: FAMILY MEDICINE

## 2024-01-26 PROCEDURE — 3078F DIAST BP <80 MM HG: CPT | Performed by: FAMILY MEDICINE

## 2024-01-26 PROCEDURE — 90686 IIV4 VACC NO PRSV 0.5 ML IM: CPT

## 2024-01-26 PROCEDURE — 3074F SYST BP LT 130 MM HG: CPT | Performed by: FAMILY MEDICINE

## 2024-01-26 PROCEDURE — 99213 OFFICE O/P EST LOW 20 MIN: CPT | Performed by: FAMILY MEDICINE

## 2024-01-26 ASSESSMENT — FIBROSIS 4 INDEX: FIB4 SCORE: 0.55

## 2024-01-26 NOTE — ASSESSMENT & PLAN NOTE
2 month history of recurrent boil following a Brazilian Wax.  She has tried using neosporin, hydrogen peroxide to area  States it will get smaller and pop and scab over and then will recur the next day  States she will pop it on her own.   Denies any associated fevers, chills, pain otherwise.

## 2024-01-26 NOTE — PROGRESS NOTES
"Subjective   Chief Complaint   Patient presents with    Skin Lesion     Near vaginal, after brazilian wax         HPI:   Joann presents today with    Boil, groin  2 month history of recurrent boil following a Brazilian Wax.  She has tried using neosporin, hydrogen peroxide to area  States it will get smaller and pop and scab over and then will recur the next day  States she will pop it on her own.   Denies any associated fevers, chills, pain otherwise.      Health Maintenance Due   Topic Date Due    Influenza Vaccine (1) 09/01/2023    COVID-19 Vaccine (3 - 2023-24 season) 09/01/2023    Cervical Cancer Screening  09/18/2023       Objective   Social History     Tobacco Use    Smoking status: Never    Smokeless tobacco: Never   Vaping Use    Vaping Use: Never used   Substance Use Topics    Alcohol use: Yes     Alcohol/week: 0.6 oz     Types: 1 Standard drinks or equivalent per week     Comment: occ    Drug use: Never       Exam:  /70   Pulse 80   Temp 36.5 °C (97.7 °F)   Resp 16   Ht 1.588 m (5' 2.52\")   Wt 63 kg (139 lb)   SpO2 98%   BMI 25.00 kg/m²     Physical Exam  Constitutional: Alert, no distress  Skin: small area of localized folliculitis near right supra pubic area, no discrete collection  Eye: Conjunctiva clear, lids normal  Respiratory: Unlabored respiratory effort, no cough  MSK: Normal gait, moves all extremities  Psych: Alert and oriented x3, normal affect and mood    No Known Allergies    [facility] [address] [address]  Phone: [phone] Fax: [phone]    [facility] [address] [address]  Phone: [phone] Fax: [phone]    [facility] [address] [place] [address]  [address] [address]  Phone: [phone] Fax: [phone]    Current Outpatient Medications   Medication Sig Dispense Refill    mupirocin (BACTROBAN) 2 % Ointment Apply 1 Application topically 2 times a day. 22 " g 0     No current facility-administered medications for this visit.       Assessment & Plan    23 y.o. female with the following -     1. Boil, groin  - Acute on chronic condition; likely triggered by hair removal process  - Recommend frequent warm compresses to promote healing  - Will start with topical antibiotic treatment recurrence  - mupirocin (BACTROBAN) 2 % Ointment; Apply 1 Application topically 2 times a day.  Dispense: 22 g; Refill: 0  -Patient advised to return to office if symptoms persist or worsen for possible I&D; will defer for now due to small size    2. High serum low density lipoprotein (LDL) cholesterol  Lab Results   Component Value Date/Time    CHOLSTRLTOT 224 (H) 06/05/2023 09:10 AM     (H) 06/05/2023 09:10 AM    HDL 71 06/05/2023 09:10 AM    TRIGLYCERIDE 33 06/05/2023 09:10 AM     - Elevated on last screen.  Patient endorses changes to diet and regular exercise since then  - Reviewed results as above in detail  - Recommend repeat to reassess  - Encouraged continued intensive lifestyle modifications to help progression including consumption of:  nutrient-dense foods that are high in fiber (? 14 g of fiber per 1,000 kcal), minimally processed carbohydrates (such as non-starchy vegetables, fruits, legumes, and whole grains), and non-dairy products with minimally added sugar over intake from other carbohydrate sources   Plant based or Mediterranean-style diet rich in monounsaturated and polyunsaturated fats to improve glucose metabolism and reduce cardiovascular disease risk   foods rich in omega-3 fatty acids (such as fatty fish, nuts, and seeds) to prevent/treat cardiovascular disease   replace consumption of sugar-sweetened beverages (including fruit juices) with water or low calorie, no calorie beverages in order to control glycemia and reduce risk for cardiometabolic disease   minimize consumption of foods with added sugars   - Encouraged heart healthy diet and lifestyle with  additional possible symptoms recommendations and resources shared via [facility] [employer]    3. Need for vaccination  - INFLUENZA VACCINE QUAD INJ (PF)    Return if symptoms worsen or fail to improve, for routine annual wellness exam, pap smear.    Please note that this dictation was created using voice recognition software. I have made every reasonable attempt to correct obvious errors, but I expect that there are errors of grammar and possibly content that I did not discover before finalizing the note.

## 2024-02-08 ENCOUNTER — HOSPITAL ENCOUNTER (OUTPATIENT)
Facility: MEDICAL CENTER | Age: 24
End: 2024-02-08
Attending: FAMILY MEDICINE
Payer: MEDICAID

## 2024-02-08 ENCOUNTER — OFFICE VISIT (OUTPATIENT)
Dept: MEDICAL GROUP | Facility: MEDICAL CENTER | Age: 24
End: 2024-02-08
Attending: FAMILY MEDICINE
Payer: MEDICAID

## 2024-02-08 VITALS
RESPIRATION RATE: 16 BRPM | BODY MASS INDEX: 25.16 KG/M2 | OXYGEN SATURATION: 99 % | HEART RATE: 100 BPM | SYSTOLIC BLOOD PRESSURE: 116 MMHG | TEMPERATURE: 97.8 F | WEIGHT: 142 LBS | HEIGHT: 63 IN | DIASTOLIC BLOOD PRESSURE: 62 MMHG

## 2024-02-08 DIAGNOSIS — Z01.419 WELL WOMAN EXAM WITH ROUTINE GYNECOLOGICAL EXAM: ICD-10-CM

## 2024-02-08 DIAGNOSIS — Z12.4 CERVICAL CANCER SCREENING: ICD-10-CM

## 2024-02-08 PROCEDURE — G0438 PPPS, INITIAL VISIT: HCPCS | Performed by: FAMILY MEDICINE

## 2024-02-08 PROCEDURE — 3078F DIAST BP <80 MM HG: CPT | Performed by: FAMILY MEDICINE

## 2024-02-08 PROCEDURE — 87591 N.GONORRHOEAE DNA AMP PROB: CPT

## 2024-02-08 PROCEDURE — 87491 CHLMYD TRACH DNA AMP PROBE: CPT

## 2024-02-08 PROCEDURE — 88175 CYTOPATH C/V AUTO FLUID REDO: CPT

## 2024-02-08 PROCEDURE — 99459 PELVIC EXAMINATION: CPT | Performed by: FAMILY MEDICINE

## 2024-02-08 PROCEDURE — 3074F SYST BP LT 130 MM HG: CPT | Performed by: FAMILY MEDICINE

## 2024-02-08 PROCEDURE — G0438 PPPS, INITIAL VISIT: HCPCS | Mod: 25 | Performed by: FAMILY MEDICINE

## 2024-02-08 ASSESSMENT — FIBROSIS 4 INDEX: FIB4 SCORE: 0.55

## 2024-02-08 NOTE — PROGRESS NOTES
Subjective:     CC:   Chief Complaint   Patient presents with    Gynecologic Exam       HPI:   Joann Medley is a 23 y.o. female who presents for annual exam. Patient is feeling well and denies any complaints.    Ob-Gyn/ History:    Patient has GYN provider: No  /Para:     Last Pap Smear:  . No history of abnormal pap smears.  Gyn Surgery:  none.  Current Contraceptive Method:  Condoms; had IUD removed . Currently sexually active.  Last menstrual period:  23.  Periods regular. moderate bleeding. Cramping is moderate.   Patient does not take OTC analgesics for cramps.  No significant bloating/fluid retention, pelvic pain, or dyspareunia. No vaginal discharge  Urinary incontinence: n/a  Folate intake: no  No plans for pregnancy within the next year    Health Maintenance  Cholesterol Screening:   Lab Results   Component Value Date/Time    CHOLSTRLTOT 224 (H) 2023 09:10 AM     (H) 2023 09:10 AM    HDL 71 2023 09:10 AM    TRIGLYCERIDE 33 2023 09:10 AM       Lab Results   Component Value Date/Time    SODIUM 140 2023 09:10 AM    POTASSIUM 4.2 2023 09:10 AM    CHLORIDE 108 2023 09:10 AM    CO2 22 2023 09:10 AM    GLUCOSE 83 2023 09:10 AM    BUN 12 2023 09:10 AM    CREATININE 0.98 2023 09:10 AM     Lab Results   Component Value Date/Time    ALKPHOSPHAT 42 2023 09:10 AM    ASTSGOT 22 2023 09:10 AM    ALTSGPT 13 2023 09:10 AM    TBILIRUBIN 0.5 2023 09:10 AM        Diabetes Screening:   Lab Results   Component Value Date/Time    HBA1C 4.9 2023 09:10 AM       Anticipatory Guidance  Diet: Eats well balanced diet typically; will eat buttery steak regularly; primarily eats home cooked meals. Works at [employer] and will eat lunch there- [email or web address] or other meals from [employer]. Denies soda consumption. Drinks primarily water.   Exercise: Goes to the gym regularly 4x/week; treadmill, weight  machines. 1-1:20 min at a time  Substance Abuse: n/a  Safe in relationship.   Seat belts, helmet, gun, and sun safety discussed.    Cancer screening  Cervical Cancer Screening: performed 23    Health Maintenance Due   Topic Date Due    COVID-19 Vaccine (3 - 2023-24 season) 2023    Cervical Cancer Screening  2023     Infectious disease screening/Immunizations  --STI Screenin2022; ordered  --Practices safe sex.  --HIV Screenin23; normal/negative  --Hepatitis C Screenin23; normal/negative  --Immunizations:    Influenza: up to date    HPV:  up to date    Tetanus: up to date; due    Shingles: n/a    Pneumococcal : n/a     Hepatitis B: up to date   COVID-19: due for booster    She  has a past medical history of Patient denies medical problems.    She has no past medical history of Ulcer.  She  has no past surgical history on file.    Family History   Problem Relation Age of Onset    Hypertension Mother     Hypertension Father     Hypertension Maternal Grandmother     Cancer Paternal Grandmother         ?    Diabetes Neg Hx     Lung Disease Neg Hx     Heart Disease Neg Hx     Hyperlipidemia Neg Hx        Social History     Socioeconomic History    Marital status: Single     Spouse name: Not on file    Number of children: 1    Years of education: Not on file    Highest education level: Not on file   Occupational History     Comment:  at the [employer]   Tobacco Use    Smoking status: Never    Smokeless tobacco: Never   Vaping Use    Vaping Use: Never used   Substance and Sexual Activity    Alcohol use: Yes     Alcohol/week: 0.6 oz     Types: 1 Standard drinks or equivalent per week     Comment: occ    Drug use: Never    Sexual activity: Yes     Partners: Male     Birth control/protection: Condom Male, I.U.D.   Other Topics Concern    Behavioral problems No    Interpersonal relationships No    Sad or not enjoying activities No    Suicidal thoughts No    Poor school performance  No    Reading difficulties No    Speech difficulties No    Writing difficulties No    Inadequate sleep No    Excessive TV viewing No    Excessive video game use No    Inadequate exercise No    Sports related No    Poor diet Yes    Family concerns for drug/alcohol abuse No    Poor oral hygiene No    Bike safety Yes    Family concerns vehicle safety No   Social History Narrative    Lives with mom, MGM, aunt, and daughter     Social Determinants of Health     Financial Resource Strain: Not on file   Food Insecurity: Not on file   Transportation Needs: Not on file   Physical Activity: Not on file   Stress: Not on file   Social Connections: Not on file   Intimate Partner Violence: Not on file   Housing Stability: Not on file       Patient Active Problem List    Diagnosis Date Noted    Boil, groin 01/26/2024    Lump of skin of back 06/05/2023    Anxiety and depression 06/05/2023    Screening examination for sexually transmitted disease 03/24/2021         Current Outpatient Medications   Medication Sig Dispense Refill    Levonorgestrel (MIRENA, 52 MG, IU) by Intrauterine route.      mupirocin (BACTROBAN) 2 % Ointment Apply 1 Application topically 2 times a day. 22 g 0     No current facility-administered medications for this visit.     No Known Allergies    Review of Systems   Constitutional: Negative for fever, chills and malaise/fatigue.   HENT: Negative for congestion.    Eyes: Negative for pain.    Respiratory: Negative for cough and shortness of breath.  Cardiovascular: Negative for leg swelling.   Gastrointestinal: Negative for nausea, vomiting, abdominal pain and diarrhea.   Genitourinary: Negative for dysuria and hematuria.   Skin: Negative for rash.   Neurological: Negative for dizziness, focal weakness and headaches.   Endo/Heme/Allergies: Does not bleed easily.   Psychiatric/Behavioral: Negative for depression.  The patient is not nervous/anxious.      Objective:     There were no vitals taken for this  visit.  There is no height or weight on file to calculate BMI.  Wt Readings from Last 4 Encounters:   01/26/24 63 kg (139 lb)   07/17/23 65.9 kg (145 lb 4.8 oz)   06/05/23 66.2 kg (146 lb)   08/21/22 72.6 kg (160 lb)       Physical Exam:  Constitutional: Well-developed and well-nourished. Not diaphoretic. No distress.   Skin: Skin is warm and dry. No rash noted.  Head: Atraumatic without lesions.  Eyes: Conjunctivae and extraocular motions are normal. Pupils are equal, round, and reactive to light. No scleral icterus.   Ears:  External ears unremarkable. Tympanic membranes clear and intact.  Nose: Nares patent.  No discharge.   Mouth/Throat: Dentition is normal. Tongue normal. Oropharynx is clear and moist. Posterior pharynx without erythema or exudates.  Neck: Supple, trachea midline. Normal range of motion. No thyromegaly present. No lymphadenopathy--cervical or supraclavicular.  Cardiovascular: Regular rate and rhythm, no murmur, rubs, or gallops. Notable regular skipped beat on auscultation after every ?3 beat  Lungs: Normal inspiratory effort on room air, CTA bilaterally, no wheezes/rhonchi/rales  Abdomen: Soft, non tender, and without distention. Active bowel sounds in all four quadrants. No rebound, guarding, masses or HSM.  :Perineum and external genitalia normal without rash. Vagina with normal and physiologic discharge. Cervix without visible lesions or discharge. Bimanual exam without adnexal masses or cervical motion tenderness.  Extremities: No cyanosis, clubbing, erythema, nor edema. Distal pulses intact and symmetric.   Musculoskeletal: All major joints AROM full in all directions without pain.  Neurological: Alert and oriented x 3. DTRs 2+/3 and symmetric. No cranial nerve deficit. 5/5 myotomes. Sensation intact.   Psychiatric:  Behavior, mood, and affect are appropriate.    A chaperone was offered to the patient during today's exam. Chaperone name: REJI (SHIRA) was present.    Assessment and Plan:  "    1. Well woman exam with routine gynecological exam  Preventative Medicine / HCM visit with no emergent concerns.  - Recommended yearly HCM visits  - Discussed importance of healthy diet and exercise (5x/week, 20-30 minutes of sustained cardiovascular training)  -Advised that pt should take 400 - 800 mcg of folic acid per day.  -Advised on maintaining routine vaccinations  - PE notable for irregular cardiac rhythm; had EKG done previously for syncopal episode per chart review.  Patient currently asymptomatic endorses strenuous activity on a regular basis for many months without cardiac symptoms.  Consider Holter monitoring for further assessment.  -Will contact with abnormal lab results.  -Advised pt to wear helmets. seatbelt and sunscreen as discussed; continue safer sex practices  - Anticipatory guidance including:  Exercise:   - Try for at least 150 minutes of cardiovascular (strenuous) exercise per week.   Safety:   - Wear your seat belt at all times when in a car and NO TEXTING/CELL PHONES while driving.   - Wear a helmet while biking, using a motorcycle, skiing/snow boarding, skate/long boarding, or any activities faster than running.   - Avoid smoking.   - If you have a gun it needs to be locked up and stored unloaded away from children.   Nutrition:   - Drink enough water so that urine is light yellow/clear  - Try to avoid alcoholic drinks; or consume no more than 2 alcoholic drinks per day for men. No more than 1 alcoholic drink per day for woman.   - Read labels for calories   - Use website \"My Fitness Pal\" for free calorie counting tool when possible.   Stress:   - Make time for activities that allow you to decrease stress and recognize any red flags of stress for you to know when to activate your stress release mechanisms.   Sleep:   - Try to get 7-9 hours of sleep each night.   Preventative Care:   - Follow-up yearly for your annual exams   - Pap smears start at age 21 repeat every 3-5 years " depending on age and results until age 64  - Colon cancer screening starting at age 45 until age 75  - Mammograms for breast cancer screening every 1-2 years can start at age 40; general recommendation is biennial screening mammography for women aged 50 to 74 years  - Annual flu vaccination   - Bone density screening at age 65  - Pneumonia vaccination at age 65   - Shingles vaccination at age 50    2. Cervical cancer screening  - Routine screen; if normal can repeat in 3 years.  - THINPREP RFLX HPV ASCUS W/CTNG    Other orders  - Levonorgestrel (MIRENA, 52 MG, IU); by Intrauterine route.       Follow-up: Return in about 1 year (around 2/8/2025) for routine annual wellness exam.

## 2024-02-15 LAB
C TRACH RRNA CVX QL NAA+PROBE: NEGATIVE
COMMENT NL11729A: NORMAL
CYTOLOGIST CVX/VAG CYTO: NORMAL
CYTOLOGY CVX/VAG DOC CYTO: NORMAL
CYTOLOGY CVX/VAG DOC THIN PREP: NORMAL
N GONORRHOEA RRNA CVX QL NAA+PROBE: NEGATIVE
NOTE NL11727A: NORMAL
OTHER STN SPEC: NORMAL
STAT OF ADQ CVX/VAG CYTO-IMP: NORMAL

## 2024-03-03 ENCOUNTER — APPOINTMENT (OUTPATIENT)
Dept: URGENT CARE | Facility: CLINIC | Age: 24
End: 2024-03-03
Payer: MEDICAID

## 2024-03-04 ENCOUNTER — HOSPITAL ENCOUNTER (OUTPATIENT)
Facility: MEDICAL CENTER | Age: 24
End: 2024-03-04
Attending: STUDENT IN AN ORGANIZED HEALTH CARE EDUCATION/TRAINING PROGRAM
Payer: MEDICAID

## 2024-03-04 ENCOUNTER — OFFICE VISIT (OUTPATIENT)
Dept: URGENT CARE | Facility: CLINIC | Age: 24
End: 2024-03-04
Payer: MEDICAID

## 2024-03-04 VITALS
TEMPERATURE: 97.5 F | DIASTOLIC BLOOD PRESSURE: 64 MMHG | SYSTOLIC BLOOD PRESSURE: 100 MMHG | BODY MASS INDEX: 25.4 KG/M2 | HEIGHT: 62 IN | OXYGEN SATURATION: 98 % | WEIGHT: 138 LBS | HEART RATE: 88 BPM | RESPIRATION RATE: 20 BRPM

## 2024-03-04 DIAGNOSIS — N89.8 VAGINAL DISCHARGE: ICD-10-CM

## 2024-03-04 DIAGNOSIS — Z11.3 SCREENING EXAMINATION FOR SEXUALLY TRANSMITTED DISEASE: ICD-10-CM

## 2024-03-04 LAB
APPEARANCE UR: CLEAR
BILIRUB UR STRIP-MCNC: NEGATIVE MG/DL
COLOR UR AUTO: YELLOW
GLUCOSE UR STRIP.AUTO-MCNC: NEGATIVE MG/DL
KETONES UR STRIP.AUTO-MCNC: NEGATIVE MG/DL
LEUKOCYTE ESTERASE UR QL STRIP.AUTO: NEGATIVE
NITRITE UR QL STRIP.AUTO: NEGATIVE
PH UR STRIP.AUTO: 6 [PH] (ref 5–8)
PROT UR QL STRIP: NEGATIVE MG/DL
RBC UR QL AUTO: NEGATIVE
SP GR UR STRIP.AUTO: 1.01
UROBILINOGEN UR STRIP-MCNC: 0.2 MG/DL

## 2024-03-04 PROCEDURE — 99213 OFFICE O/P EST LOW 20 MIN: CPT | Performed by: STUDENT IN AN ORGANIZED HEALTH CARE EDUCATION/TRAINING PROGRAM

## 2024-03-04 PROCEDURE — 87491 CHLMYD TRACH DNA AMP PROBE: CPT

## 2024-03-04 PROCEDURE — 87510 GARDNER VAG DNA DIR PROBE: CPT

## 2024-03-04 PROCEDURE — 3074F SYST BP LT 130 MM HG: CPT | Performed by: STUDENT IN AN ORGANIZED HEALTH CARE EDUCATION/TRAINING PROGRAM

## 2024-03-04 PROCEDURE — 87591 N.GONORRHOEAE DNA AMP PROB: CPT

## 2024-03-04 PROCEDURE — 87660 TRICHOMONAS VAGIN DIR PROBE: CPT

## 2024-03-04 PROCEDURE — 3078F DIAST BP <80 MM HG: CPT | Performed by: STUDENT IN AN ORGANIZED HEALTH CARE EDUCATION/TRAINING PROGRAM

## 2024-03-04 PROCEDURE — 87480 CANDIDA DNA DIR PROBE: CPT

## 2024-03-04 PROCEDURE — 81002 URINALYSIS NONAUTO W/O SCOPE: CPT | Performed by: STUDENT IN AN ORGANIZED HEALTH CARE EDUCATION/TRAINING PROGRAM

## 2024-03-04 ASSESSMENT — ENCOUNTER SYMPTOMS
FLANK PAIN: 0
VOMITING: 0
CHILLS: 0
CONSTIPATION: 0
DIARRHEA: 0
ABDOMINAL PAIN: 0
FEVER: 0
NAUSEA: 0

## 2024-03-04 ASSESSMENT — FIBROSIS 4 INDEX: FIB4 SCORE: 0.55

## 2024-03-04 NOTE — PROGRESS NOTES
"Subjective     Joann Medley is a 23 y.o. female who presents with Sexually Transmitted Diseases (Patient is here today for STD testing. Patient states some clear discharge. )            Joann is a 23 y.o. female who presents to urgent care requesting STD's screening/testing.  Patient reports unprotected sex with a new partner.  Patient reports some vaginal discharge but thinks that vaginal discharge was normal and consistent with normal vaginal discharge. No other symptoms.        Review of Systems   Constitutional:  Negative for chills, fever and malaise/fatigue.   Gastrointestinal:  Negative for abdominal pain, constipation, diarrhea, nausea and vomiting.   Genitourinary:  Negative for dysuria, flank pain, frequency, hematuria and urgency.   Skin:  Negative for itching and rash.   All other systems reviewed and are negative.             Objective     /64 (BP Location: Left arm, Patient Position: Sitting, BP Cuff Size: Adult)   Pulse 88   Temp 36.4 °C (97.5 °F) (Temporal)   Resp 20   Ht 1.575 m (5' 2\")   Wt 62.6 kg (138 lb)   SpO2 98%   BMI 25.24 kg/m²      Physical Exam  Vitals reviewed.   Constitutional:       Appearance: Normal appearance.   HENT:      Head: Normocephalic.      Nose: Nose normal.      Mouth/Throat:      Mouth: Mucous membranes are moist.   Eyes:      Extraocular Movements: Extraocular movements intact.      Conjunctiva/sclera: Conjunctivae normal.      Pupils: Pupils are equal, round, and reactive to light.   Pulmonary:      Effort: Pulmonary effort is normal.   Skin:     General: Skin is warm.   Neurological:      General: No focal deficit present.      Mental Status: She is alert.                             Assessment & Plan        1. Screening examination for sexually transmitted disease  - POCT Urinalysis  - POCT Pregnancy  - Chlamydia/GC, PCR (Genital/Anal swab); Future  - VAGINAL PATHOGENS DNA PANEL; Future  - HIV AG/AB Combo Assay Screening; Future  - " T.Pallidum AB KP (Screening); Future    2. Vaginal discharge  - Chlamydia/GC, PCR (Genital/Anal swab); Future  - VAGINAL PATHOGENS DNA PANEL; Future     Results will be available via [employer].    Patient will be contacted of any positive results and will be started on appropriate therapy at that time if indicated.    Instructed to return to urgent care or nearest emergency department for any change in condition, further concerns, or new concerning symptoms.    Patient states understanding and agrees with the plan of care and discharge instructions.

## 2024-03-05 ENCOUNTER — TELEPHONE (OUTPATIENT)
Dept: URGENT CARE | Facility: PHYSICIAN GROUP | Age: 24
End: 2024-03-05
Payer: MEDICAID

## 2024-03-05 DIAGNOSIS — N76.0 BV (BACTERIAL VAGINOSIS): ICD-10-CM

## 2024-03-05 DIAGNOSIS — B96.89 BV (BACTERIAL VAGINOSIS): ICD-10-CM

## 2024-03-05 DIAGNOSIS — A59.01 TRICHOMONIASIS OF VAGINA: ICD-10-CM

## 2024-03-05 LAB
C TRACH DNA GENITAL QL NAA+PROBE: NEGATIVE
CANDIDA DNA VAG QL PROBE+SIG AMP: NEGATIVE
G VAGINALIS DNA VAG QL PROBE+SIG AMP: POSITIVE
N GONORRHOEA DNA GENITAL QL NAA+PROBE: NEGATIVE
SPECIMEN SOURCE: NORMAL
T VAGINALIS DNA VAG QL PROBE+SIG AMP: POSITIVE

## 2024-03-05 RX ORDER — METRONIDAZOLE 500 MG/1
500 TABLET ORAL 2 TIMES DAILY
Qty: 14 TABLET | Refills: 0 | Status: SHIPPED | OUTPATIENT
Start: 2024-03-05 | End: 2024-03-12

## 2024-03-05 NOTE — TELEPHONE ENCOUNTER
See below results.     Latest Reference Range & Units 03/04/24 12:05   Candida species DNA Probe Negative  Negative   Gardnerella vaginalis DNA Probe Negative  POSITIVE !   Source  Genital   Trichamonas vaginalis DNA Probe Negative  POSITIVE !   !: Data is abnormal    Prescription sent to pharmacy on file.  Notify sexual partner to be seen/evaluated as sexual partner will need to be tested/treated for Trichamonas as well.    If symptoms fail to improve, for any change in condition, further concerns, or new concerning symptoms please return to urgent care for reevaluation or follow up with PCP.

## 2024-05-21 ENCOUNTER — HOSPITAL ENCOUNTER (OUTPATIENT)
Facility: MEDICAL CENTER | Age: 24
End: 2024-05-21
Attending: FAMILY MEDICINE
Payer: MEDICAID

## 2024-05-21 ENCOUNTER — HOSPITAL ENCOUNTER (OUTPATIENT)
Dept: LAB | Facility: MEDICAL CENTER | Age: 24
End: 2024-05-21
Attending: FAMILY MEDICINE
Payer: MEDICAID

## 2024-05-21 ENCOUNTER — OFFICE VISIT (OUTPATIENT)
Dept: MEDICAL GROUP | Facility: MEDICAL CENTER | Age: 24
End: 2024-05-21
Attending: FAMILY MEDICINE
Payer: MEDICAID

## 2024-05-21 VITALS
HEART RATE: 105 BPM | OXYGEN SATURATION: 99 % | TEMPERATURE: 97 F | BODY MASS INDEX: 25.69 KG/M2 | DIASTOLIC BLOOD PRESSURE: 68 MMHG | HEIGHT: 62 IN | RESPIRATION RATE: 16 BRPM | WEIGHT: 139.6 LBS | SYSTOLIC BLOOD PRESSURE: 116 MMHG

## 2024-05-21 DIAGNOSIS — Z86.19 HISTORY OF TRICHOMONIASIS: ICD-10-CM

## 2024-05-21 DIAGNOSIS — Z11.3 SCREENING FOR STD (SEXUALLY TRANSMITTED DISEASE): ICD-10-CM

## 2024-05-21 LAB — AMBIGUOUS DTTM AMBI4: NORMAL

## 2024-05-21 PROCEDURE — 3074F SYST BP LT 130 MM HG: CPT | Performed by: FAMILY MEDICINE

## 2024-05-21 PROCEDURE — 99213 OFFICE O/P EST LOW 20 MIN: CPT | Performed by: FAMILY MEDICINE

## 2024-05-21 PROCEDURE — 3078F DIAST BP <80 MM HG: CPT | Performed by: FAMILY MEDICINE

## 2024-05-21 ASSESSMENT — PATIENT HEALTH QUESTIONNAIRE - PHQ9: CLINICAL INTERPRETATION OF PHQ2 SCORE: 0

## 2024-05-21 ASSESSMENT — FIBROSIS 4 INDEX: FIB4 SCORE: 0.55

## 2024-05-21 NOTE — PROGRESS NOTES
Verbal consent was acquired by the patient to use [employer] listening note generation during this visit.    Subjective   Chief Complaint   Patient presents with    Sexually Transmitted Diseases     Wants std test        HPI:   Joann presents today with    History of Present Illness  The patient presents for STD testing.    The patient is seeking STD testing as a precautionary measure, despite the absence of symptoms indicative of sexually transmitted diseases. She reports no recent sexual activity with a new partner. Her medical history includes a previous diagnosis of Chlamydia, for which she received treatment at an urgent care facility in 03/2024. Currently, she is not on any contraceptive measures, and her intrauterine device (IUD) was subsequently removed in 2021. The most recent STD testing was conducted in 03/2024 at an urgent care facility, during which she was tested for gonorrhea and Chlamydia, both of which yielded negative results. Blood work for HIV and syphilis was also ordered, although it is unclear if this was completed. Vaginal testing at that time tested positive for trichomonas and BV, prompting her visit today for follow-up testing regarding her previous treatment regimen. She was prescribed Flagyl for both conditions, which she completed. Currently, she has been utilizing condoms more consistently. She reports feeling well overall and denies any signs of infection.      Health Maintenance Due   Topic Date Due    COVID-19 Vaccine (3 - 2023-24 season) 09/01/2023       Objective   Social History     Tobacco Use    Smoking status: Never    Smokeless tobacco: Never   Vaping Use    Vaping status: Never Used   Substance Use Topics    Alcohol use: Yes     Alcohol/week: 0.6 oz     Types: 1 Standard drinks or equivalent per week     Comment: occ    Drug use: Never       Exam:  /68 (BP Location: Right arm, Patient Position: Sitting, BP Cuff Size: Adult)   Pulse (!) 105   Temp 36.1 °C  "(97 °F) (Temporal)   Resp 16   Ht 1.575 m (5' 2\")   Wt 63.3 kg (139 lb 9.6 oz)   SpO2 99%   BMI 25.53 kg/m²     Physical Exam  Constitutional: Alert, no distress  Skin: No rashes in visible areas  Eye: Conjunctiva clear, lids normal  Respiratory: Unlabored respiratory effort, no cough  MSK: Normal gait, moves all extremities  Psych: Alert and oriented x3, normal affect and mood    No Known Allergies    [facility] [address] [place] [address], [place] [address]  [address] [address]  Phone: [phone] Fax: [phone]    No current outpatient medications on file.     No current facility-administered medications for this visit.       Assessment & Plan    23 y.o. female with the following -   1. Screening for STD (sexually transmitted disease)  HIV AG/AB Combo Assay Screening    T.Pallidum AB KP (Screening)    Trichomonas Vaginalis by TMA    Hepatitis C Virus Antibody    HEP B Surface Antibody    Hep B Core AB Total    Hep B Surface Antigen    Chlamydia/GC, PCR (Urine)      2. History of trichomoniasis  HIV AG/AB Combo Assay Screening    T.Pallidum AB KP (Screening)    Trichomonas Vaginalis by TMA    Hepatitis C Virus Antibody    HEP B Surface Antibody    Hep B Core AB Total    Hep B Surface Antigen    Chlamydia/GC, PCR (Urine)        Assessment & Plan  1. Screening for sexually transmitted diseases.  A comprehensive STD panel, inclusive of HIV, hepatitis C, and various hepatitis tests, will be conducted. Additionally, follow up testing will be performed to test for trichomoniasis. The patient will be notified of the results via a [employer] message. Encouraged safer sex practices.    Return if symptoms worsen or fail to improve.    Please note that this dictation was created using voice recognition software. I have made every reasonable attempt to correct obvious errors, but I expect that there are errors of grammar and possibly content that I did not discover before finalizing the note.      "

## 2024-05-22 LAB
C TRACH DNA SPEC QL NAA+PROBE: NEGATIVE
C TRACH DNA SPEC QL NAA+PROBE: NEGATIVE
HBV CORE AB SERPL QL IA: NONREACTIVE
HBV SURFACE AB SERPL IA-ACNC: 37 MIU/ML (ref 0–10)
HBV SURFACE AG SER QL: NORMAL
HCV AB SER QL: NORMAL
HIV 1+2 AB+HIV1 P24 AG SERPL QL IA: NORMAL
N GONORRHOEA DNA SPEC QL NAA+PROBE: NEGATIVE
N GONORRHOEA DNA SPEC QL NAA+PROBE: NEGATIVE
SPECIMEN SOURCE: NORMAL
SPECIMEN SOURCE: NORMAL
T PALLIDUM AB SER QL IA: NORMAL

## 2024-05-23 LAB
SPEC CONTAINER SPEC: NORMAL
SPEC CONTAINER SPEC: NORMAL
SPECIMEN SOURCE: NORMAL
SPECIMEN SOURCE: NORMAL
T VAGINALIS RRNA SPEC QL NAA+PROBE: NEGATIVE
T VAGINALIS RRNA SPEC QL NAA+PROBE: NEGATIVE

## 2025-02-05 ENCOUNTER — APPOINTMENT (OUTPATIENT)
Dept: URGENT CARE | Facility: CLINIC | Age: 25
End: 2025-02-05

## 2025-07-14 NOTE — PROGRESS NOTES
Chief Complaint:   Chief Complaint   Patient presents with   • Follow-Up     std       HPI: Established patient  Joann Medley is a 20 y.o. female who presents for treatment for chlamydia and gonorrhea    Gonorrhea /Chlamydia  Patient cultures came positive for gonorrhea and chlamydia, she is here today for her ceftriaxone injection.  Patient reports that she already informed her partner and her got treatment.  Reports that she only took 1 of the pills from her Z-Rashad 500 mg.  She reports no lower abdominal pain or abnormal vaginal discharge or burning sensation.        Past medical history, family history, social history and medications reviewed and updated in the record.  Today  Current medications, problem list and allergies reviewed in [facility] today  Health maintenance topics are reviewed and updated.    There are no active problems to display for this patient.    Family History   Problem Relation Age of Onset   • Cancer Paternal Grandmother         ?     Social History     Socioeconomic History   • Marital status: Single     Spouse name: Not on file   • Number of children: Not on file   • Years of education: Not on file   • Highest education level: Not on file   Occupational History   • Not on file   Social Needs   • Financial resource strain: Not on file   • Food insecurity     Worry: Not on file     Inability: Not on file   • Transportation needs     Medical: Not on file     Non-medical: Not on file   Tobacco Use   • Smoking status: Never Smoker   • Smokeless tobacco: Never Used   Substance and Sexual Activity   • Alcohol use: No     Alcohol/week: 0.0 oz   • Drug use: No   • Sexual activity: Yes     Partners: Male     Birth control/protection: Pill     Comment: Unplanned pregnanacy. Pt states was pregnant while on the pill   Lifestyle   • Physical activity     Days per week: Not on file     Minutes per session: Not on file   • Stress: Not on file   Relationships   • Social connections     Talks on  "phone: Not on file     Gets together: Not on file     Attends Bahai service: Not on file     Active member of club or organization: Not on file     Attends meetings of clubs or organizations: Not on file     Relationship status: Not on file   • Intimate partner violence     Fear of current or ex partner: Not on file     Emotionally abused: Not on file     Physically abused: Not on file     Forced sexual activity: Not on file   Other Topics Concern   • Behavioral problems No   • Interpersonal relationships No   • Sad or not enjoying activities No   • Suicidal thoughts No   • Poor school performance No   • Reading difficulties No   • Speech difficulties No   • Writing difficulties No   • Inadequate sleep No   • Excessive TV viewing No   • Excessive video game use No   • Inadequate exercise No   • Sports related No   • Poor diet Yes   • Family concerns for drug/alcohol abuse No   • Poor oral hygiene No   • Bike safety Yes   • Family concerns vehicle safety No   Social History Narrative   • Not on file       Current Outpatient Medications   Medication Sig Dispense Refill   • azithromycin (ZITHROMAX) 500 MG tablet Take 2 Tabs by mouth Once for 1 dose. 2 Tab 0   • ibuprofen (MOTRIN) 400 MG Tab Take 400 mg by mouth every 6 hours as needed.     • acetaminophen (TYLENOL) 500 MG Tab Take 500 mg by mouth every 6 hours as needed for Moderate Pain.     • Prenatal MV-Min-Fe Fum-FA-DHA (PRENATAL 1 PO) Take 1 Tab by mouth every day.       No current facility-administered medications for this visit.          Review Of Systems  As documented in HPI above  PHYSICAL EXAMINATION:    /72 (BP Location: Left arm, Patient Position: Sitting, BP Cuff Size: Adult)   Pulse (!) 104   Temp 36.3 °C (97.3 °F) (Temporal)   Resp 14   Ht 1.575 m (5' 2\")   Wt 66.3 kg (146 lb 1.6 oz)   SpO2 98%   BMI 26.72 kg/m²   Gen.: Well-developed, well-nourished, no apparent distress, pleasant and cooperative with the examination  HEENT: " Normocephalic/atraumatic,   Neck: No JVD or bruits, no adenopathy  Cor: Regular rate and rhythm without murmur gallop or rub    Extremities: No cyanosis, clubbing or edema        ASSESSMENT/Plan:    1. Gonorrhea   counseled strongly on safe sexual behavior and using protection.  Advised to avoid sexual intercourse until she is completely treated.    cefTRIAXone (ROCEPHIN) injection 250 mg   2. Chlamydia   I will repeat another course of azithromycin 1 g, since she did not complete the previous course.  azithromycin (ZITHROMAX) 500 MG tablet       Please note that this dictation was created using voice recognition software. I have made every reasonable attempt to correct obvious errors but there may be errors of grammar and content that I may have overlooked prior to finalization of this note.        English